# Patient Record
Sex: FEMALE | Race: WHITE | NOT HISPANIC OR LATINO | URBAN - METROPOLITAN AREA
[De-identification: names, ages, dates, MRNs, and addresses within clinical notes are randomized per-mention and may not be internally consistent; named-entity substitution may affect disease eponyms.]

---

## 2023-10-27 DIAGNOSIS — C34.90 LUNG CANCER: Primary | ICD-10-CM

## 2023-11-03 ENCOUNTER — DOCUMENTATION ONLY (OUTPATIENT)
Dept: HEMATOLOGY/ONCOLOGY | Facility: CLINIC | Age: 63
End: 2023-11-03
Payer: MEDICARE

## 2023-11-03 PROBLEM — E11.9 DIABETES MELLITUS TREATED WITH INSULIN: Status: ACTIVE | Noted: 2023-11-03

## 2023-11-03 PROBLEM — M06.9 RHEUMATOID ARTHRITIS, INVOLVING UNSPECIFIED SITE, UNSPECIFIED WHETHER RHEUMATOID FACTOR PRESENT: Status: ACTIVE | Noted: 2023-11-03

## 2023-11-03 PROBLEM — E27.49 IATROGENIC ADRENAL INSUFFICIENCY: Status: ACTIVE | Noted: 2023-11-03

## 2023-11-03 PROBLEM — Z79.4 DIABETES MELLITUS TREATED WITH INSULIN: Status: ACTIVE | Noted: 2023-11-03

## 2023-11-03 PROBLEM — C34.92 MALIGNANT NEOPLASM OF LEFT LUNG: Status: ACTIVE | Noted: 2023-11-03

## 2023-11-03 NOTE — PROGRESS NOTES
Subjective:       Patient ID: Rosanna Rich is a 63 y.o. female.    Chief Complaint: Follow-up (Patient is here for her new patient appointment, last seen was in 2019)    Referring: Self referral  Oncology: Dr. Nguyen    Diagnosis: Recurrent adenocarcinoma of lung overlapping sites    Current Treatment: [No matching plan found]     Treatment History:  2009-initial diagnosis. T2 N1 M0. S/P resection followed by adjuvant Cisplatin  8/11/2017: Second recurrence-T3 N0 adenocarcinoma RLL lobectomy. PDL-1 5%. Negative ALK. 4 cycles of adjuvant carboplatin and pemetrexed  8/30/2019: Recurrent T4 adenocarcinoma with pleural involvement, M1 lymph node gastrohepatic and left dome of liver.   9/19/19: Carboplatin/Taxol/Keytruda followed by Keytruda maintenance, last dose  between  4/ or 7/22/22---stopped due to pneumonitis  Pneumonitis-- was on Steroids  --SOB and oxygen      new nodule   1/ 2023   7mm                                                   9mm---                              8/2023 + PET still growing  Biopsy   9/27--- Adenocarcinoma  PET 10/18/2023--final report pending                                                        Clinical History:  This is a 64 y/o female with long standing history of recurrent adenocarcinoma of the lung. See above history and treatment.   She was last seen in this clinic as a second opinion in August of 2019. She continued her therapy in [place].   In August of 2023, she was diagnosed with her 4th recurrence in the RM.      Oncology History   Malignant neoplasm of left lung   9/25/2023 Pathology Significant Finding    RM core biopsy- Focal moderately differentiated adenocarcinoma  PDL-1 58%       Interval History:   11/06/2023   She presents today as a self-referral second opinion on her 4th recurrence of lung cancer.     Past Medical History:   Diagnosis Date    Anemia     Depression     Lung cancer       Past Surgical History:   Procedure Laterality Date    AUGMENTATION OF  BREAST      COLONOSCOPY      HYSTERECTOMY      LOBECTOMY Right     R middle and R lower lobe    LUNG BIOPSY Left     PORTACATH PLACEMENT      TUBAL LIGATION       Social History     Socioeconomic History    Marital status:    Tobacco Use    Smoking status: Former     Types: Cigarettes    Smokeless tobacco: Never   Substance and Sexual Activity    Alcohol use: Yes     Comment: Social      Family History   Adopted: Yes   Problem Relation Age of Onset    Cancer Father         Lung      Review of patient's allergies indicates:  No Known Allergies   Review of Systems   Constitutional:  Negative for appetite change and unexpected weight change.   HENT:  Negative for mouth sores.    Eyes:  Negative for visual disturbance.   Respiratory:  Positive for cough and shortness of breath.    Cardiovascular:  Positive for chest pain.   Gastrointestinal:  Negative for abdominal pain and diarrhea.   Genitourinary:  Negative for frequency.   Musculoskeletal:  Negative for back pain.   Skin:  Negative for rash.   Neurological:  Positive for headaches.   Hematological:  Negative for adenopathy. Bruises/bleeds easily.   Psychiatric/Behavioral:  The patient is nervous/anxious.       A complete 12 point ROS was performed in full with pertinent positives as described in interval history. Remainder of ROS done in full and are negative.        Objective:      Physical Exam  Vitals reviewed.   Constitutional:       General: She is awake.      Appearance: Normal appearance.   HENT:      Head: Normocephalic and atraumatic.      Right Ear: Tympanic membrane normal.      Left Ear: Tympanic membrane normal.      Mouth/Throat:      Mouth: Mucous membranes are moist.      Pharynx: Oropharynx is clear.   Eyes:      Extraocular Movements: Extraocular movements intact.      Conjunctiva/sclera: Conjunctivae normal.      Pupils: Pupils are equal, round, and reactive to light.   Cardiovascular:      Rate and Rhythm: Normal rate and regular rhythm.       Pulses: Normal pulses.      Heart sounds: Normal heart sounds.   Pulmonary:      Effort: Pulmonary effort is normal.      Breath sounds: Normal breath sounds and air entry.   Abdominal:      General: Abdomen is flat. Bowel sounds are normal.      Palpations: Abdomen is soft.      Tenderness: There is no abdominal tenderness.   Musculoskeletal:      Cervical back: Normal range of motion.      Right lower leg: No edema.      Left lower leg: No edema.   Lymphadenopathy:      Cervical: No cervical adenopathy.      Upper Body:      Right upper body: No axillary adenopathy.      Left upper body: No axillary adenopathy.      Lower Body: No right inguinal adenopathy. No left inguinal adenopathy.   Skin:     General: Skin is warm and dry.      Findings: Ecchymosis present.      Comments: Senile purpura bilateral upper extremities   Neurological:      General: No focal deficit present.      Mental Status: She is alert and oriented to person, place, and time. Mental status is at baseline.   Psychiatric:         Attention and Perception: Attention normal.         Mood and Affect: Mood and affect normal.         Behavior: Behavior is cooperative.     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  Resolved      It appears from the reports this patient has 1 local area of recurrence of her adenocarcinoma.    However ,I explained to the patient that I need to review her definitive images.    We discussed her options if she only has 1 area of recurrent disease.  Surgery, radiation.      I would be reluctant to resume systemic immunotherapy given her degree of intolerance to immunotherapy with pneumonitis diabetes, and adrenal insufficiency.          Plan:   GET PET  scan report and scan images   Follow-up with [facility]   agree with radiation therapy if only site---would agree with proton therapy given her history of pneumonitis-- to decrease her risk of radiation pneumonitis  GET NGS panel [facility] if not done in [place]   TeleMed visit in 3-4 weeks         Follow up in about 1 month (around 12/6/2023) for With ME, telemed visit.         Miguel Chisholm MD

## 2023-11-06 ENCOUNTER — OFFICE VISIT (OUTPATIENT)
Dept: HEMATOLOGY/ONCOLOGY | Facility: CLINIC | Age: 63
End: 2023-11-06
Payer: MEDICARE

## 2023-11-06 VITALS
WEIGHT: 239.44 LBS | SYSTOLIC BLOOD PRESSURE: 115 MMHG | TEMPERATURE: 98 F | HEART RATE: 92 BPM | HEIGHT: 65 IN | BODY MASS INDEX: 39.89 KG/M2 | DIASTOLIC BLOOD PRESSURE: 76 MMHG | OXYGEN SATURATION: 94 %

## 2023-11-06 DIAGNOSIS — M06.9 RHEUMATOID ARTHRITIS, INVOLVING UNSPECIFIED SITE, UNSPECIFIED WHETHER RHEUMATOID FACTOR PRESENT: ICD-10-CM

## 2023-11-06 DIAGNOSIS — C34.92 MALIGNANT NEOPLASM OF LEFT LUNG, UNSPECIFIED PART OF LUNG: Primary | ICD-10-CM

## 2023-11-06 DIAGNOSIS — C34.90 LUNG CANCER: ICD-10-CM

## 2023-11-06 DIAGNOSIS — E27.49 IATROGENIC ADRENAL INSUFFICIENCY: ICD-10-CM

## 2023-11-06 DIAGNOSIS — Z79.4 DIABETES MELLITUS TREATED WITH INSULIN: ICD-10-CM

## 2023-11-06 DIAGNOSIS — E11.9 DIABETES MELLITUS TREATED WITH INSULIN: ICD-10-CM

## 2023-11-06 PROCEDURE — 99205 PR OFFICE/OUTPT VISIT, NEW, LEVL V, 60-74 MIN: ICD-10-PCS | Mod: S$PBB,,, | Performed by: INTERNAL MEDICINE

## 2023-11-06 PROCEDURE — 99205 OFFICE O/P NEW HI 60 MIN: CPT | Mod: S$PBB,,, | Performed by: INTERNAL MEDICINE

## 2023-11-06 PROCEDURE — 99214 OFFICE O/P EST MOD 30 MIN: CPT | Mod: PBBFAC | Performed by: INTERNAL MEDICINE

## 2023-11-06 PROCEDURE — 99999 PR PBB SHADOW E&M-EST. PATIENT-LVL IV: CPT | Mod: PBBFAC,,, | Performed by: INTERNAL MEDICINE

## 2023-11-06 PROCEDURE — 99999 PR PBB SHADOW E&M-EST. PATIENT-LVL IV: ICD-10-PCS | Mod: PBBFAC,,, | Performed by: INTERNAL MEDICINE

## 2023-11-06 RX ORDER — OLMESARTAN MEDOXOMIL 40 MG/1
TABLET ORAL
COMMUNITY

## 2023-11-06 RX ORDER — PREDNISONE 5 MG/1
5 TABLET ORAL
COMMUNITY
Start: 2023-10-30

## 2023-11-06 RX ORDER — INSULIN LISPRO 100 [IU]/ML
INJECTION, SOLUTION INTRAVENOUS; SUBCUTANEOUS
COMMUNITY
Start: 2023-10-10

## 2023-11-06 RX ORDER — ASPIRIN 325 MG/1
TABLET, FILM COATED ORAL
COMMUNITY

## 2023-11-06 RX ORDER — HYDROCODONE BITARTRATE AND ACETAMINOPHEN 10; 300 MG/1; MG/1
TABLET ORAL
COMMUNITY

## 2023-11-06 RX ORDER — ATORVASTATIN CALCIUM 20 MG/1
20 TABLET, FILM COATED ORAL
COMMUNITY
Start: 2023-09-21

## 2023-11-06 RX ORDER — HYDROXYCHLOROQUINE SULFATE 200 MG/1
200 TABLET, FILM COATED ORAL 2 TIMES DAILY
COMMUNITY
Start: 2023-10-03

## 2023-11-06 RX ORDER — LEVOTHYROXINE SODIUM 175 UG/1
175 TABLET ORAL
COMMUNITY
Start: 2023-10-23

## 2023-11-06 RX ORDER — SULFASALAZINE 500 MG/1
TABLET ORAL
COMMUNITY
Start: 2023-08-21

## 2023-11-06 RX ORDER — AMLODIPINE BESYLATE 5 MG/1
5 TABLET ORAL
COMMUNITY
Start: 2023-10-11

## 2023-11-06 RX ORDER — ZOLPIDEM TARTRATE 10 MG/1
10 TABLET ORAL NIGHTLY PRN
COMMUNITY
Start: 2023-10-09

## 2023-12-05 ENCOUNTER — PATIENT MESSAGE (OUTPATIENT)
Dept: HEMATOLOGY/ONCOLOGY | Facility: CLINIC | Age: 63
End: 2023-12-05

## 2023-12-05 ENCOUNTER — TELEPHONE (OUTPATIENT)
Dept: HEMATOLOGY/ONCOLOGY | Facility: CLINIC | Age: 63
End: 2023-12-05
Payer: MEDICARE

## 2023-12-05 NOTE — TELEPHONE ENCOUNTER
Spoke to patients daughter; patient is currently having bronchoscope at [facility].  Patient has appointment with Dr. Ramos 12/14/23 for simulation.  Radiation is tenatative to start depending on biopsy results.  Dina or her mother will call to advise when pathology resulted.  Advised I will also follow up on 12/14/23.

## 2023-12-15 ENCOUNTER — TELEPHONE (OUTPATIENT)
Dept: HEMATOLOGY/ONCOLOGY | Facility: CLINIC | Age: 63
End: 2023-12-15
Payer: MEDICARE

## 2023-12-15 NOTE — TELEPHONE ENCOUNTER
Spoke to patient; she is currently still at [facility].    Patient had simulation 12/14/23; radiation therapy is expected to start either 12/26/23 or 1/3/24, Monday through Friday for 7 weeks.  Advised I would have her appointment pushed back 8 weeks from 1/3/2024.  Patient confirmed she is active on portal and thanked me for calling.

## 2024-02-26 NOTE — PROGRESS NOTES
Subjective:       Patient ID: Rosanna Rich is a 63 y.o. female.    Chief Complaint: Follow-up (Telephone visit, has questions regarding her immunotherapy)    Referring: Self referral  Oncology: Dr. Nguyen    Diagnosis: Recurrent adenocarcinoma of lung overlapping sites    Current Treatment: [No matching plan found]     Treatment History:  2009-initial diagnosis. T2 N1 M0. S/P resection followed by adjuvant Cisplatin  8/11/2017: Second recurrence-T3 N0 adenocarcinoma RLL lobectomy. PDL-1 5%. Negative ALK. 4 cycles of adjuvant carboplatin and pemetrexed  8/30/2019: Recurrent T4 adenocarcinoma with pleural involvement, M1 lymph node gastrohepatic and left dome of liver.   9/19/19: Carboplatin/Taxol/Keytruda followed by Keytruda maintenance, last dose  between  4/ or 7/22/22---stopped due to pneumonitis  Pneumonitis-- was on Steroids  --SOB and oxygen      new nodule   1/ 2023   7mm                                                   9mm---                              8/2023 + PET still growing  Biopsy   9/27--- Adenocarcinoma  PET 10/18/2023--final report pending                                                        Clinical History:  This is a 62 y/o female with long standing history of recurrent adenocarcinoma of the lung. See above history and treatment.   She was last seen in this clinic as a second opinion in August of 2019. She continued her therapy in [place].   In August of 2023, she was diagnosed with her 4th recurrence in the RM.      Oncology History Overview Note   Treatment History:  2009-initial diagnosis. T2 N1 M0. S/P resection followed by adjuvant Cisplatin  8/11/2017: Second recurrence-T3 N0 adenocarcinoma RLL lobectomy. PDL-1 5%. Negative ALK. 4 cycles of adjuvant carboplatin and pemetrexed  8/30/2019: Recurrent T4 adenocarcinoma with pleural involvement, M1 lymph node gastrohepatic and left dome of liver.   9/19/19: Carboplatin/Taxol/Keytruda followed by Keytruda maintenance, last dose   between  4/ or 7/22/22---stopped due to pneumonitis  Pneumonitis-- was on Steroids  --SOB and oxygen      new nodule   1/ 2023   7mm                                                   9mm---                              8/2023 + PET still growing  Biopsy   9/27--- Adenocarcinoma     Malignant neoplasm of left lung   9/25/2023 Pathology Significant Finding    RM core biopsy- Focal moderately differentiated adenocarcinoma  PDL-1 58%     10/18/2023 Imaging Significant Findings    10/18/2023: PET-CT:   Asymmetric left-sided adenoid and lingula tonsil involving central base of tongue SUV 14   Left upper lobe pulmonary mass 1.7 cm SUV of 7.9, no hilar or mediastinal uptake no pulmonary parenchymal mass .    Left port, asymmetric capsular breast calcification on implant on the right, chronic interstitial lung disease, no lytic or blastic lesions the left upper lobe pulmonary nodules enlarged from 1.1 cm to 1.7 cm, consistent with growing neoplasm       1/3/2024 - 2/21/2024 Radiation Therapy    Start Treatment: 01-  End Treatment: 02-  Left lung/Mediast.   200 cGyRBE 35/35 7000/7000 cGyRBE=\   Proton 3-D plan   ==========================================================      1/3/2024 - 2/14/2024 Chemotherapy    Weekly carbo/taxol with xrt     1/23/2024 Pathology Significant Finding    MD VALDEZ ANISH: Mutation Analysis Precision Panel Interpretation and Report    KRAS c.35G>A p.G12D Exon 2 16% SNV - Missense   More than 5 genes. See details  in full report        Interval History:   02/27/2024     She presents today as a self-referral second opinion on her 4th recurrence of lung cancer.   Since our last visit, this patient has had a definitive radiation with weekly carbo Taxol and Gutiérrez.  She had proton radiation.    Her RA is good controlled.  Her steroids are weaned down.  Her sugar has been better controlled.  She get some nausea during therapy no vomiting.  No changing neuropathy.  Her breathing and  shortness of breath is stable.  No cardiac type chest pain.  She is eating well.  We had a long discussion regarding the risk of observation versus immunotherapy maintenance       Common immune-related adverse events (irAEs) - Treatment is associated with irAEs that typically are transient but occasionally can be severe or fatal. The most common and important irAEs are dermatologic, diarrhea/colitis, hepatotoxicity, pneumonitis, and endocrinopathies, although other sites can also be affected.    Past Medical History:   Diagnosis Date    Anemia     Depression     Lung cancer       Past Surgical History:   Procedure Laterality Date    AUGMENTATION OF BREAST      COLONOSCOPY      HYSTERECTOMY      LOBECTOMY Right     R middle and R lower lobe    LUNG BIOPSY Left     PORTACATH PLACEMENT      TUBAL LIGATION       Social History     Socioeconomic History    Marital status:    Tobacco Use    Smoking status: Former     Types: Cigarettes    Smokeless tobacco: Never   Substance and Sexual Activity    Alcohol use: Yes     Comment: Social      Family History   Adopted: Yes   Problem Relation Age of Onset    Cancer Father         Lung      Review of patient's allergies indicates:  No Known Allergies   Review of Systems   Constitutional:  Negative for appetite change and unexpected weight change.   HENT:  Positive for mouth sores.    Eyes:  Negative for visual disturbance.   Respiratory:  Positive for shortness of breath. Negative for cough.    Cardiovascular:  Negative for chest pain.   Gastrointestinal:  Negative for abdominal pain and diarrhea.   Genitourinary:  Negative for frequency.   Musculoskeletal:  Negative for back pain.   Skin:  Positive for rash.   Neurological:  Positive for headaches.   Hematological:  Negative for adenopathy.   Psychiatric/Behavioral:  The patient is not nervous/anxious.      A complete 12 point ROS was performed in full with pertinent positives as described in interval history. Remainder  of ROS done in full and are negative.        Objective:      Physical Exam  Pulmonary:      Effort: Pulmonary effort is normal.   Neurological:      Mental Status: She is alert and oriented to person, place, and time.   Psychiatric:         Mood and Affect: Mood normal.         Behavior: Behavior normal.         Thought Content: Thought content normal.         Judgment: Judgment normal.     LABS AND IMAGING REVIEWED IN EPIC      Assessment:   Recurrent adenocarcinoma to RM---new T1 vs M1a  lef  It appears from the reports this patient has 1 local area of recurrence of her adenocarcinoma.  However ,I explained to the patient that I need to review her definitive images.  We discussed her options if she only has 1 area of recurrent disease.  Surgery, radiation.I would be reluctant to resume systemic immunotherapy given her degree of intolerance to immunotherapy with pneumonitis diabetes, and adrenal insufficiency.    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  Resolved    Plan:   Has plans for immunotherapy  Scan in [place] 3/18 and then for 3/19 for immunotherapy  Immunotherapy they have plan for q.4 weeks x1 year.    Discussed q.2 weeks dosing versus q.4 week dosing given history.    She is going to call us back after her visit on March 19th with [facility].    Follow-up proximally April 16, 2024 with CBC chemistry.    She will not need chemo education   She will sign a new consent that day  Patient will call us around March 19th we can go into the system and add the treatment plan    Follow up in about 7 weeks (around 4/16/2024) for Treatment visit--same day with labs and treatment.   Orders Placed This Encounter    CBC Auto Differential    Comprehensive Metabolic Panel        Miguel Chisholm MD    Telemed: This visit was a telemedicine/telephone  visit.  Real-time audio and video were used following [facility] protocols.  The patient and/or family agreed to the security of information at the location.  I was located in the [facility] with this visit occurred.  The patient was located.  At home    We reviewed this patient's chemotherapy and radiation records and labs from [facility]   We reviewed her pathology.    We discussed the ongoing plan.    Total time in chart review and discussion with the patient 40 minutes        I am licensed to practice in the [facility].

## 2024-02-27 ENCOUNTER — OFFICE VISIT (OUTPATIENT)
Dept: HEMATOLOGY/ONCOLOGY | Facility: CLINIC | Age: 64
End: 2024-02-27
Payer: MEDICARE

## 2024-02-27 DIAGNOSIS — C34.92 MALIGNANT NEOPLASM OF LEFT LUNG, UNSPECIFIED PART OF LUNG: Primary | ICD-10-CM

## 2024-02-27 PROCEDURE — 99443 PR PHYSICIAN TELEPHONE EVALUATION 21-30 MIN: CPT | Mod: 95,,, | Performed by: INTERNAL MEDICINE

## 2024-02-27 RX ORDER — BISACODYL 5 MG
5 TABLET, DELAYED RELEASE (ENTERIC COATED) ORAL
COMMUNITY

## 2024-03-21 ENCOUNTER — TELEPHONE (OUTPATIENT)
Dept: HEMATOLOGY/ONCOLOGY | Facility: CLINIC | Age: 64
End: 2024-03-21
Payer: MEDICARE

## 2024-04-15 NOTE — PROGRESS NOTES
Subjective:       Patient ID: Rosanna Rich is a 63 y.o. female.    Chief Complaint: No chief complaint on file.    Referring: Self referral  Oncology: Dr. Nguyen    Diagnosis: Recurrent adenocarcinoma of lung overlapping sites    Current Treatment: OP DURVALUMAB 1500 MG Q4W                                           Clinical History:  This is a 62 y/o female with long standing history of recurrent adenocarcinoma of the lung. See above history and treatment.   She was last seen in this clinic as a second opinion in August of 2019. She continued her therapy in [place].   In August of 2023, she was diagnosed with her 4th recurrence in the RM.      Oncology History Overview Note   Treatment History:  2009-initial diagnosis. T2 N1 M0. S/P resection followed by adjuvant Cisplatin  8/11/2017: Second recurrence-T3 N0 adenocarcinoma RLL lobectomy. PDL-1 5%. Negative ALK. 4 cycles of adjuvant carboplatin and pemetrexed  8/30/2019: Recurrent T4 adenocarcinoma with pleural involvement, M1 lymph node gastrohepatic and left dome of liver.   9/19/19: Carboplatin/Taxol/Keytruda followed by Keytruda maintenance, last dose  between  4/ or 7/22/22---stopped due to pneumonitis  Pneumonitis-- was on Steroids  --SOB and oxygen  1/ 2023 new nodule  7mm/9mm---  8/2023 + PET still growing  Biopsy   9/27--- Adenocarcinoma     Malignant neoplasm of left lung   9/25/2023 Pathology Significant Finding    RM core biopsy- Focal moderately differentiated adenocarcinoma  PDL-1 58%     9/25/2023 Cancer Staged    Staging form: Lung, AJCC 8th Edition  - Clinical stage from 9/25/2023: Stage IIB (rcT1c, cN1, cM0)     10/18/2023 Imaging Significant Findings    10/18/2023: PET-CT:   Asymmetric left-sided adenoid and lingula tonsil involving central base of tongue SUV 14   Left upper lobe pulmonary mass 1.7 cm SUV of 7.9, no hilar or mediastinal uptake no pulmonary parenchymal mass .    Left port, asymmetric capsular breast calcification on  implant on the right, chronic interstitial lung disease, no lytic or blastic lesions the left upper lobe pulmonary nodules enlarged from 1.1 cm to 1.7 cm, consistent with growing neoplasm       1/3/2024 - 2/21/2024 Radiation Therapy    Start Treatment: 01-  End Treatment: 02-  Left lung/Mediast.   200 cGyRBE 35/35 7000/7000 cGyRBE=\   Proton 3-D plan   ==========================================================      1/3/2024 - 2/14/2024 Chemotherapy    Weekly carbo/taxol with xrt     1/23/2024 Pathology Significant Finding    MD JOSÉ MIGUEL OCONNELL: Mutation Analysis Precision Panel Interpretation and Report    KRAS c.35G>A p.G12D Exon 2 16% SNV - Missense   More than 5 genes. See details  in full report      2/23/2024 Research Study Participant    Research Study:    2/23/2024 -  TX-Chemotherapy/Biotherapy/Investigational/SMI (Autogenerated)      Treatment Plan     2019-1164  Autologous Lymphocyte Infusion Phase 2 (OP)      Chemotherapy summary: [No matching medication found in this treatment plan]      Treatment Dates: 2/23/2024 (Planned) to 4/5/2024 (Planned)   Line of Treatment: Solid: Metastatic, Second Line and Subsequent      Treatment Goal: Life Prolongation                  3/18/2024 Imaging Significant Findings    IMPRESSION:  Left upper lung nodules has decreased in size and metabolic activity. No new nodules or adenopathy.  Resolution of FDG avid left suprahilar lymph node.  ACTIONABLE ITEMS/RECOMMENDATIONS*: None.     3/19/2024 -  Chemotherapy    Plan Name: OP DURVALUMAB 1500 MG Q4W       Interval History:   04/16/2024   Patient presents with friend for lung cancer treatment. She received her first infusion at [facility]. She is scheduled for C2 today. She tolerated well overall, aside from a mild RA flair in her shoulders. She received joint injections. She feels well. She has no irSE, no diarrhea, rash, itching or SOB. She has an insulin pump, sugars well controlled.   Past Medical History:    Diagnosis Date    Anemia     Depression     Lung cancer       Past Surgical History:   Procedure Laterality Date    AUGMENTATION OF BREAST      COLONOSCOPY      HYSTERECTOMY      LOBECTOMY Right     R middle and R lower lobe    LUNG BIOPSY Left     PORTACATH PLACEMENT      TUBAL LIGATION       Social History     Socioeconomic History    Marital status:    Tobacco Use    Smoking status: Former     Types: Cigarettes    Smokeless tobacco: Never   Substance and Sexual Activity    Alcohol use: Yes     Comment: Social      Family History   Adopted: Yes   Problem Relation Name Age of Onset    Cancer Father          Lung      Review of patient's allergies indicates:  No Known Allergies   Review of Systems   Constitutional:  Negative for appetite change and unexpected weight change.   HENT:  Positive for mouth sores.    Eyes:  Negative for visual disturbance.   Respiratory:  Positive for shortness of breath. Negative for cough.    Cardiovascular:  Negative for chest pain.   Gastrointestinal:  Negative for abdominal pain and diarrhea.   Genitourinary:  Negative for frequency.   Musculoskeletal:  Negative for back pain.   Skin:  Positive for rash.   Neurological:  Positive for headaches.   Hematological:  Negative for adenopathy.   Psychiatric/Behavioral:  The patient is not nervous/anxious.        A complete 12 point ROS was performed in full with pertinent positives as described in interval history. Remainder of ROS done in full and are negative.        Objective:      Physical Exam  Pulmonary:      Effort: Pulmonary effort is normal.   Neurological:      Mental Status: She is alert and oriented to person, place, and time.   Psychiatric:         Mood and Affect: Mood normal.         Behavior: Behavior normal.         Thought Content: Thought content normal.         Judgment: Judgment normal.       LABS AND IMAGING REVIEWED IN EPIC      Assessment:   Recurrent adenocarcinoma to RM---new T1 vs M1a  lef  It appears  from the reports this patient has 1 local area of recurrence of her adenocarcinoma.  However ,I explained to the patient that I need to review her definitive images.  We discussed her options if she only has 1 area of recurrent disease.  Surgery, radiation.I would be reluctant to resume systemic immunotherapy given her degree of intolerance to immunotherapy with pneumonitis diabetes, and adrenal insufficiency.    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  Resolved  Hypothyroidism    Plan:   Her first durvalumab was on 3/19/24 at [facility]. Plan q4 weeks x1 year.   C2 today   She will repeat PET and MRI with [facility] in 7/19/24  CBC, CMP, TSH on return        Follow up in about 4 weeks (around 5/14/2024) for Labs, TD with Cassy, same day infusion.   Orders Placed This Encounter    Comprehensive Metabolic Panel    CBC Auto Differential    TSH

## 2024-04-16 ENCOUNTER — INFUSION (OUTPATIENT)
Dept: INFUSION THERAPY | Facility: HOSPITAL | Age: 64
End: 2024-04-16
Attending: INTERNAL MEDICINE
Payer: MEDICARE

## 2024-04-16 ENCOUNTER — OFFICE VISIT (OUTPATIENT)
Dept: HEMATOLOGY/ONCOLOGY | Facility: CLINIC | Age: 64
End: 2024-04-16
Payer: MEDICARE

## 2024-04-16 VITALS
HEART RATE: 79 BPM | TEMPERATURE: 99 F | DIASTOLIC BLOOD PRESSURE: 83 MMHG | SYSTOLIC BLOOD PRESSURE: 122 MMHG | BODY MASS INDEX: 40.96 KG/M2 | WEIGHT: 245.81 LBS | HEIGHT: 65 IN | OXYGEN SATURATION: 95 %

## 2024-04-16 VITALS
HEART RATE: 79 BPM | DIASTOLIC BLOOD PRESSURE: 85 MMHG | OXYGEN SATURATION: 94 % | TEMPERATURE: 97 F | WEIGHT: 244 LBS | SYSTOLIC BLOOD PRESSURE: 135 MMHG | BODY MASS INDEX: 40.6 KG/M2

## 2024-04-16 DIAGNOSIS — M06.9 RHEUMATOID ARTHRITIS, INVOLVING UNSPECIFIED SITE, UNSPECIFIED WHETHER RHEUMATOID FACTOR PRESENT: ICD-10-CM

## 2024-04-16 DIAGNOSIS — E03.2 HYPOTHYROIDISM DUE TO MEDICATION: ICD-10-CM

## 2024-04-16 DIAGNOSIS — E11.9 DIABETES MELLITUS TREATED WITH INSULIN: ICD-10-CM

## 2024-04-16 DIAGNOSIS — D84.821 IMMUNODEFICIENCY DUE TO DRUGS: ICD-10-CM

## 2024-04-16 DIAGNOSIS — Z79.899 IMMUNODEFICIENCY DUE TO DRUGS: ICD-10-CM

## 2024-04-16 DIAGNOSIS — Z79.4 DIABETES MELLITUS TREATED WITH INSULIN: ICD-10-CM

## 2024-04-16 DIAGNOSIS — C34.12 MALIGNANT NEOPLASM OF UPPER LOBE OF LEFT LUNG: Primary | ICD-10-CM

## 2024-04-16 DIAGNOSIS — C34.92 MALIGNANT NEOPLASM OF LEFT LUNG, UNSPECIFIED PART OF LUNG: Primary | ICD-10-CM

## 2024-04-16 PROCEDURE — 25000003 PHARM REV CODE 250: Performed by: NURSE PRACTITIONER

## 2024-04-16 PROCEDURE — 99999 PR PBB SHADOW E&M-EST. PATIENT-LVL IV: CPT | Mod: PBBFAC,,, | Performed by: NURSE PRACTITIONER

## 2024-04-16 PROCEDURE — 99215 OFFICE O/P EST HI 40 MIN: CPT | Mod: S$PBB,,, | Performed by: NURSE PRACTITIONER

## 2024-04-16 PROCEDURE — 96413 CHEMO IV INFUSION 1 HR: CPT

## 2024-04-16 PROCEDURE — 63600175 PHARM REV CODE 636 W HCPCS: Performed by: NURSE PRACTITIONER

## 2024-04-16 PROCEDURE — 99214 OFFICE O/P EST MOD 30 MIN: CPT | Mod: PBBFAC | Performed by: NURSE PRACTITIONER

## 2024-04-16 RX ORDER — EPINEPHRINE 0.3 MG/.3ML
0.3 INJECTION SUBCUTANEOUS ONCE AS NEEDED
Status: DISCONTINUED | OUTPATIENT
Start: 2024-04-16 | End: 2024-04-16 | Stop reason: HOSPADM

## 2024-04-16 RX ORDER — HEPARIN 100 UNIT/ML
500 SYRINGE INTRAVENOUS
Status: CANCELLED | OUTPATIENT
Start: 2024-04-16

## 2024-04-16 RX ORDER — HEPARIN 100 UNIT/ML
500 SYRINGE INTRAVENOUS
Status: DISCONTINUED | OUTPATIENT
Start: 2024-04-16 | End: 2024-04-16 | Stop reason: HOSPADM

## 2024-04-16 RX ORDER — SODIUM CHLORIDE 0.9 % (FLUSH) 0.9 %
10 SYRINGE (ML) INJECTION
Status: DISCONTINUED | OUTPATIENT
Start: 2024-04-16 | End: 2024-04-16 | Stop reason: HOSPADM

## 2024-04-16 RX ORDER — DIPHENHYDRAMINE HYDROCHLORIDE 50 MG/ML
50 INJECTION INTRAMUSCULAR; INTRAVENOUS ONCE AS NEEDED
Status: CANCELLED | OUTPATIENT
Start: 2024-04-16

## 2024-04-16 RX ORDER — LOSARTAN POTASSIUM 100 MG/1
100 TABLET ORAL DAILY
COMMUNITY

## 2024-04-16 RX ORDER — SODIUM CHLORIDE 0.9 % (FLUSH) 0.9 %
10 SYRINGE (ML) INJECTION
Status: CANCELLED | OUTPATIENT
Start: 2024-04-16

## 2024-04-16 RX ORDER — DIPHENHYDRAMINE HYDROCHLORIDE 50 MG/ML
50 INJECTION INTRAMUSCULAR; INTRAVENOUS ONCE AS NEEDED
Status: DISCONTINUED | OUTPATIENT
Start: 2024-04-16 | End: 2024-04-16 | Stop reason: HOSPADM

## 2024-04-16 RX ORDER — EPINEPHRINE 0.3 MG/.3ML
0.3 INJECTION SUBCUTANEOUS ONCE AS NEEDED
Status: CANCELLED | OUTPATIENT
Start: 2024-04-16

## 2024-04-16 RX ADMIN — SODIUM CHLORIDE: 9 INJECTION, SOLUTION INTRAVENOUS at 11:04

## 2024-04-16 RX ADMIN — SODIUM CHLORIDE 1500 MG: 9 INJECTION, SOLUTION INTRAVENOUS at 11:04

## 2024-04-16 RX ADMIN — HEPARIN 500 UNITS: 100 SYRINGE at 12:04

## 2024-05-13 NOTE — PROGRESS NOTES
Subjective:       Patient ID: Rosanna Rich is a 63 y.o. female.    Chief Complaint: Follow-up (Patient is here for her treatment visit)    Referring: Self referral  Oncology: Dr. Nguyen    Diagnosis: Recurrent adenocarcinoma of lung overlapping sites    Current Treatment: OP DURVALUMAB 1500 MG Q4W                                           Clinical History:  This is a 62 y/o female with long standing history of recurrent adenocarcinoma of the lung. See above history and treatment.   She was last seen in this clinic as a second opinion in August of 2019. She continued her therapy in [place].   In August of 2023, she was diagnosed with her 4th recurrence in the RM.      Oncology History Overview Note   Treatment History:  2009-initial diagnosis. T2 N1 M0. S/P resection followed by adjuvant Cisplatin  8/11/2017: Second recurrence-T3 N0 adenocarcinoma RLL lobectomy. PDL-1 5%. Negative ALK. 4 cycles of adjuvant carboplatin and pemetrexed  8/30/2019: Recurrent T4 adenocarcinoma with pleural involvement, M1 lymph node gastrohepatic and left dome of liver.   9/19/19: Carboplatin/Taxol/Keytruda followed by Keytruda maintenance, last dose  between  4/ or 7/22/22---stopped due to pneumonitis  Pneumonitis-- was on Steroids  --SOB and oxygen  1/ 2023 new nodule  7mm/9mm---  8/2023 + PET still growing  Biopsy   9/27--- Adenocarcinoma     Malignant neoplasm of left lung   9/25/2023 Pathology Significant Finding    RM core biopsy- Focal moderately differentiated adenocarcinoma  PDL-1 58%     9/25/2023 Cancer Staged    Staging form: Lung, AJCC 8th Edition  - Clinical stage from 9/25/2023: Stage IIB (rcT1c, cN1, cM0)     10/18/2023 Imaging Significant Findings    10/18/2023: PET-CT:   Asymmetric left-sided adenoid and lingula tonsil involving central base of tongue SUV 14   Left upper lobe pulmonary mass 1.7 cm SUV of 7.9, no hilar or mediastinal uptake no pulmonary parenchymal mass .    Left port, asymmetric capsular  breast calcification on implant on the right, chronic interstitial lung disease, no lytic or blastic lesions the left upper lobe pulmonary nodules enlarged from 1.1 cm to 1.7 cm, consistent with growing neoplasm       1/3/2024 - 2/21/2024 Radiation Therapy    Start Treatment: 01-  End Treatment: 02-  Left lung/Mediast.   200 cGyRBE 35/35 7000/7000 cGyRBE=\   Proton 3-D plan   ==========================================================      1/3/2024 - 2/14/2024 Chemotherapy    Weekly carbo/taxol with xrt     1/23/2024 Pathology Significant Finding    MD JOSÉ MIGUEL OCONNELL: Mutation Analysis Precision Panel Interpretation and Report    KRAS c.35G>A p.G12D Exon 2 16% SNV - Missense   More than 5 genes. See details  in full report      2/23/2024 Research Study Participant    Research Study:    2/23/2024 -  TX-Chemotherapy/Biotherapy/Investigational/SMI (Autogenerated)      Treatment Plan     2019-1164  Autologous Lymphocyte Infusion Phase 2 (OP)      Chemotherapy summary: [No matching medication found in this treatment plan]      Treatment Dates: 2/23/2024 (Planned) to 4/5/2024 (Planned)   Line of Treatment: Solid: Metastatic, Second Line and Subsequent      Treatment Goal: Life Prolongation                  3/18/2024 Imaging Significant Findings    IMPRESSION:  Left upper lung nodules has decreased in size and metabolic activity. No new nodules or adenopathy.  Resolution of FDG avid left suprahilar lymph node.  ACTIONABLE ITEMS/RECOMMENDATIONS*: None.     3/19/2024 -  Chemotherapy    Plan Name: OP DURVALUMAB 1500 MG Q4W       Interval History:   05/14/2024   Patient presents with friend for lung cancer treatment. She received her first infusion at [facility]. She is scheduled for C3 today. She seems to be tolerating well overall.She again had a RA flair that lasted a day. Her pain was controlled with percocet, however, this time was worse than last time. She has no irSE, no diarrhea, rash, itching or SOB. She has an  insulin pump, sugars not at goal, but overall stable. She has chronic PEREZ, she sees pulmonary at [facility], she was prescribed an ICS which was expensive, so she did not get it filled. I will prescribe a substitute on insurance formulary. She is adamant about continuing treatment.   Past Medical History:   Diagnosis Date    Anemia     Depression     Lung cancer       Past Surgical History:   Procedure Laterality Date    AUGMENTATION OF BREAST      COLONOSCOPY      HYSTERECTOMY      LOBECTOMY Right     R middle and R lower lobe    LUNG BIOPSY Left     PORTACATH PLACEMENT      TUBAL LIGATION       Social History     Socioeconomic History    Marital status:    Tobacco Use    Smoking status: Former     Types: Cigarettes    Smokeless tobacco: Never   Substance and Sexual Activity    Alcohol use: Yes     Comment: Social      Family History   Adopted: Yes   Problem Relation Name Age of Onset    Cancer Father          Lung      Review of patient's allergies indicates:  No Known Allergies   Review of Systems   Constitutional:  Negative for appetite change and unexpected weight change.   HENT:  Positive for mouth sores.    Eyes:  Negative for visual disturbance.   Respiratory:  Positive for shortness of breath. Negative for cough.    Cardiovascular:  Negative for chest pain.   Gastrointestinal:  Negative for abdominal pain and diarrhea.   Genitourinary:  Negative for frequency.   Musculoskeletal:  Negative for back pain.   Skin:  Positive for rash.   Neurological:  Positive for headaches.   Hematological:  Negative for adenopathy.   Psychiatric/Behavioral:  The patient is not nervous/anxious.        A complete 12 point ROS was performed in full with pertinent positives as described in interval history. Remainder of ROS done in full and are negative.        Objective:      Vitals:    05/14/24 1321   BP: 122/87   Pulse: 89   Temp: 98.8 °F (37.1 °C)      Physical Exam  Constitutional:       Appearance: Normal appearance.    HENT:      Head: Normocephalic and atraumatic.      Nose: Nose normal.      Mouth/Throat:      Mouth: Mucous membranes are moist.   Eyes:      Extraocular Movements: Extraocular movements intact.      Conjunctiva/sclera: Conjunctivae normal.      Pupils: Pupils are equal, round, and reactive to light.   Cardiovascular:      Rate and Rhythm: Normal rate and regular rhythm.      Pulses: Normal pulses.   Pulmonary:      Effort: Pulmonary effort is normal.      Breath sounds: Normal breath sounds.   Abdominal:      General: Bowel sounds are normal.      Palpations: Abdomen is soft.   Musculoskeletal:      Cervical back: Normal range of motion and neck supple.   Skin:     Findings: Bruising present.      Comments: Areas of ecchymosis to forearms. Bruising to abdomen from diabetes monitor.    Neurological:      General: No focal deficit present.      Mental Status: She is alert and oriented to person, place, and time. Mental status is at baseline.   Psychiatric:         Mood and Affect: Mood normal.         Behavior: Behavior normal.         Thought Content: Thought content normal.         Judgment: Judgment normal.       LABS AND IMAGING REVIEWED IN EPIC      Assessment:   Recurrent adenocarcinoma to RM---new T1 vs M1a  lef  It appears from the reports this patient has 1 local area of recurrence of her adenocarcinoma.  However ,I explained to the patient that I need to review her definitive images.  We discussed her options if she only has 1 area of recurrent disease.  Surgery, radiation.I would be reluctant to resume systemic immunotherapy given her degree of intolerance to immunotherapy with pneumonitis diabetes, and adrenal insufficiency.    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  Resolved  Hypothyroidism  Risk of pneumonitis due to prior  "history-prescribed QVAR but $$$$. Changed to pulmicort  Chronic PEREZ    Plan:   Her first durvalumab was on 3/19/24 at [facility]. Plan q4 weeks x1 year.   She is adamant about continuing treatment q4w at this time. I have offered q2w. She would like to periodically check her lungs for infiltrates to give her "peace of mind" She is aware to call for any sign of worsening SOB, cough, fever, etc.  C3 today   She will repeat PET and MRI with [facility] in 7/19/24  CBC, CMP, TSH on return  CXR at [facility]          Follow up in about 4 weeks (around 6/11/2024) for Labs, TD with [facility] and same day infusion.       Orders Placed This Encounter    X-Ray Chest PA And Lateral    budesonide 180mcg (PULMICORT FLEXHALER) 180 mcg/actuation AePB                "

## 2024-05-14 ENCOUNTER — OFFICE VISIT (OUTPATIENT)
Dept: HEMATOLOGY/ONCOLOGY | Facility: CLINIC | Age: 64
End: 2024-05-14
Payer: MEDICARE

## 2024-05-14 ENCOUNTER — INFUSION (OUTPATIENT)
Dept: INFUSION THERAPY | Facility: HOSPITAL | Age: 64
End: 2024-05-14
Attending: INTERNAL MEDICINE
Payer: MEDICARE

## 2024-05-14 VITALS
OXYGEN SATURATION: 94 % | HEIGHT: 65 IN | SYSTOLIC BLOOD PRESSURE: 122 MMHG | TEMPERATURE: 99 F | DIASTOLIC BLOOD PRESSURE: 87 MMHG | BODY MASS INDEX: 40.62 KG/M2 | HEART RATE: 89 BPM | WEIGHT: 243.81 LBS | RESPIRATION RATE: 20 BRPM

## 2024-05-14 VITALS
TEMPERATURE: 99 F | HEART RATE: 89 BPM | WEIGHT: 243.81 LBS | SYSTOLIC BLOOD PRESSURE: 122 MMHG | DIASTOLIC BLOOD PRESSURE: 87 MMHG | HEIGHT: 65 IN | OXYGEN SATURATION: 94 % | BODY MASS INDEX: 40.62 KG/M2

## 2024-05-14 DIAGNOSIS — C34.12 MALIGNANT NEOPLASM OF UPPER LOBE OF LEFT LUNG: Primary | ICD-10-CM

## 2024-05-14 DIAGNOSIS — D84.821 IMMUNODEFICIENCY DUE TO DRUGS: ICD-10-CM

## 2024-05-14 DIAGNOSIS — R05.2 SUBACUTE COUGH: ICD-10-CM

## 2024-05-14 DIAGNOSIS — Z79.899 IMMUNODEFICIENCY DUE TO DRUGS: ICD-10-CM

## 2024-05-14 DIAGNOSIS — J42 CHRONIC BRONCHITIS, UNSPECIFIED CHRONIC BRONCHITIS TYPE: ICD-10-CM

## 2024-05-14 DIAGNOSIS — C34.92 MALIGNANT NEOPLASM OF LEFT LUNG, UNSPECIFIED PART OF LUNG: Primary | ICD-10-CM

## 2024-05-14 DIAGNOSIS — E03.2 HYPOTHYROIDISM DUE TO MEDICATION: ICD-10-CM

## 2024-05-14 DIAGNOSIS — E11.9 DIABETES MELLITUS TREATED WITH INSULIN: ICD-10-CM

## 2024-05-14 DIAGNOSIS — Z79.4 DIABETES MELLITUS TREATED WITH INSULIN: ICD-10-CM

## 2024-05-14 PROCEDURE — 25000003 PHARM REV CODE 250: Performed by: NURSE PRACTITIONER

## 2024-05-14 PROCEDURE — 96413 CHEMO IV INFUSION 1 HR: CPT

## 2024-05-14 PROCEDURE — 63600175 PHARM REV CODE 636 W HCPCS: Performed by: NURSE PRACTITIONER

## 2024-05-14 PROCEDURE — 99215 OFFICE O/P EST HI 40 MIN: CPT | Mod: S$PBB,,, | Performed by: NURSE PRACTITIONER

## 2024-05-14 PROCEDURE — 99999 PR PBB SHADOW E&M-EST. PATIENT-LVL III: CPT | Mod: PBBFAC,,, | Performed by: NURSE PRACTITIONER

## 2024-05-14 PROCEDURE — A4216 STERILE WATER/SALINE, 10 ML: HCPCS | Performed by: NURSE PRACTITIONER

## 2024-05-14 PROCEDURE — 99213 OFFICE O/P EST LOW 20 MIN: CPT | Mod: PBBFAC,25 | Performed by: NURSE PRACTITIONER

## 2024-05-14 RX ORDER — EPINEPHRINE 0.3 MG/.3ML
0.3 INJECTION SUBCUTANEOUS ONCE AS NEEDED
Status: CANCELLED | OUTPATIENT
Start: 2024-05-14

## 2024-05-14 RX ORDER — HEPARIN 100 UNIT/ML
500 SYRINGE INTRAVENOUS
Status: DISCONTINUED | OUTPATIENT
Start: 2024-05-14 | End: 2024-05-14 | Stop reason: HOSPADM

## 2024-05-14 RX ORDER — DIPHENHYDRAMINE HYDROCHLORIDE 50 MG/ML
50 INJECTION INTRAMUSCULAR; INTRAVENOUS ONCE AS NEEDED
Status: CANCELLED | OUTPATIENT
Start: 2024-05-14

## 2024-05-14 RX ORDER — DIPHENHYDRAMINE HYDROCHLORIDE 50 MG/ML
50 INJECTION INTRAMUSCULAR; INTRAVENOUS ONCE AS NEEDED
Status: DISCONTINUED | OUTPATIENT
Start: 2024-05-14 | End: 2024-05-14 | Stop reason: HOSPADM

## 2024-05-14 RX ORDER — EPINEPHRINE 0.3 MG/.3ML
0.3 INJECTION SUBCUTANEOUS ONCE AS NEEDED
Status: DISCONTINUED | OUTPATIENT
Start: 2024-05-14 | End: 2024-05-14 | Stop reason: HOSPADM

## 2024-05-14 RX ORDER — PREDNISONE 10 MG/1
10 TABLET ORAL DAILY
COMMUNITY
End: 2024-05-20

## 2024-05-14 RX ORDER — BUDESONIDE 180 UG/1
2 AEROSOL, POWDER RESPIRATORY (INHALATION) EVERY 12 HOURS
Qty: 1 EACH | Refills: 1 | Status: SHIPPED | OUTPATIENT
Start: 2024-05-14

## 2024-05-14 RX ORDER — HEPARIN 100 UNIT/ML
500 SYRINGE INTRAVENOUS
Status: CANCELLED | OUTPATIENT
Start: 2024-05-14

## 2024-05-14 RX ORDER — SODIUM CHLORIDE 0.9 % (FLUSH) 0.9 %
10 SYRINGE (ML) INJECTION
Status: CANCELLED | OUTPATIENT
Start: 2024-05-14

## 2024-05-14 RX ORDER — SODIUM CHLORIDE 0.9 % (FLUSH) 0.9 %
10 SYRINGE (ML) INJECTION
Status: DISCONTINUED | OUTPATIENT
Start: 2024-05-14 | End: 2024-05-14 | Stop reason: HOSPADM

## 2024-05-14 RX ADMIN — Medication 10 ML: at 03:05

## 2024-05-14 RX ADMIN — SODIUM CHLORIDE: 9 INJECTION, SOLUTION INTRAVENOUS at 02:05

## 2024-05-14 RX ADMIN — HEPARIN 500 UNITS: 100 SYRINGE at 03:05

## 2024-05-14 RX ADMIN — SODIUM CHLORIDE 1500 MG: 9 INJECTION, SOLUTION INTRAVENOUS at 02:05

## 2024-05-15 ENCOUNTER — TELEPHONE (OUTPATIENT)
Dept: HEMATOLOGY/ONCOLOGY | Facility: CLINIC | Age: 64
End: 2024-05-15
Payer: MEDICARE

## 2024-05-15 NOTE — TELEPHONE ENCOUNTER
Spoke to patient and advised RX sent to pharmacy of choice; she thanked me for calling.    Irena Bray Tracie L, HECTOR; Latisha Sharma, LPN  FYI-Just spoke with patient about her chest x-ray, she is scheduled with [facility] in the am for the testing. She inquired about her inhaler if it has been ordered and sent to her pharmacy?  I told her I would send a message to you.  Thanks

## 2024-05-20 ENCOUNTER — PATIENT MESSAGE (OUTPATIENT)
Dept: HEMATOLOGY/ONCOLOGY | Facility: CLINIC | Age: 64
End: 2024-05-20
Payer: MEDICARE

## 2024-05-20 DIAGNOSIS — C34.92 MALIGNANT NEOPLASM OF LEFT LUNG, UNSPECIFIED PART OF LUNG: Primary | ICD-10-CM

## 2024-05-20 RX ORDER — PREDNISONE 20 MG/1
TABLET ORAL
Qty: 60 TABLET | Refills: 0 | Status: SHIPPED | OUTPATIENT
Start: 2024-05-20 | End: 2024-06-11

## 2024-05-21 DIAGNOSIS — C34.92 MALIGNANT NEOPLASM OF LEFT LUNG, UNSPECIFIED PART OF LUNG: Primary | ICD-10-CM

## 2024-05-23 ENCOUNTER — PATIENT MESSAGE (OUTPATIENT)
Dept: HEMATOLOGY/ONCOLOGY | Facility: CLINIC | Age: 64
End: 2024-05-23
Payer: MEDICARE

## 2024-05-24 NOTE — PROGRESS NOTES
Subjective:       Patient ID: Rosanna Rich is a 63 y.o. female.    Chief Complaint: Follow-up (Patient is here for her 4 week visit. States that her walking O2 SAT is usually 79 - 82)    Referring: Self referral  Oncology: Dr. Nguyen    Diagnosis: Recurrent adenocarcinoma of lung overlapping sites    Current Treatment: OP DURVALUMAB 1500 MG Q4W                                           Clinical History:  This is a 62 y/o female with long standing history of recurrent adenocarcinoma of the lung. See above history and treatment.   She was last seen in this clinic as a second opinion in August of 2019. She continued her therapy in [place].   In August of 2023, she was diagnosed with her 4th recurrence in the RM.      Oncology History Overview Note   Treatment History:  2009-initial diagnosis. T2 N1 M0. S/P resection followed by adjuvant Cisplatin  8/11/2017: Second recurrence-T3 N0 adenocarcinoma RLL lobectomy. PDL-1 5%. Negative ALK. 4 cycles of adjuvant carboplatin and pemetrexed  8/30/2019: Recurrent T4 adenocarcinoma with pleural involvement, M1 lymph node gastrohepatic and left dome of liver.   9/19/19: Carboplatin/Taxol/Keytruda followed by Keytruda maintenance, last dose  between  4/ or 7/22/22---stopped due to pneumonitis  Pneumonitis-- was on Steroids  --SOB and oxygen  1/ 2023 new nodule  7mm/9mm---  8/2023 + PET still growing  Biopsy   9/27--- Adenocarcinoma     Malignant neoplasm of left lung   9/25/2023 Pathology Significant Finding    RM core biopsy- Focal moderately differentiated adenocarcinoma  PDL-1 58%     9/25/2023 Cancer Staged    Staging form: Lung, AJCC 8th Edition  - Clinical stage from 9/25/2023: Stage IIB (rcT1c, cN1, cM0)     10/18/2023 Imaging Significant Findings    10/18/2023: PET-CT:   Asymmetric left-sided adenoid and lingula tonsil involving central base of tongue SUV 14   Left upper lobe pulmonary mass 1.7 cm SUV of 7.9, no hilar or mediastinal uptake no pulmonary  parenchymal mass .    Left port, asymmetric capsular breast calcification on implant on the right, chronic interstitial lung disease, no lytic or blastic lesions the left upper lobe pulmonary nodules enlarged from 1.1 cm to 1.7 cm, consistent with growing neoplasm       1/3/2024 - 2/21/2024 Radiation Therapy    Start Treatment: 01-  End Treatment: 02-  Left lung/Mediast.   200 cGyRBE 35/35 7000/7000 cGyRBE=\   Proton 3-D plan   ==========================================================      1/3/2024 - 2/14/2024 Chemotherapy    Weekly carbo/taxol with xrt     1/23/2024 Pathology Significant Finding    MD VALDEZ ANISH: Mutation Analysis Precision Panel Interpretation and Report    KRAS c.35G>A p.G12D Exon 2 16% SNV - Missense   More than 5 genes. See details  in full report      2/23/2024 Research Study Participant    Research Study:    2/23/2024 -  TX-Chemotherapy/Biotherapy/Investigational/SMI (Autogenerated)      Treatment Plan     2019-1164  Autologous Lymphocyte Infusion Phase 2 (OP)      Chemotherapy summary: [No matching medication found in this treatment plan]      Treatment Dates: 2/23/2024 (Planned) to 4/5/2024 (Planned)   Line of Treatment: Solid: Metastatic, Second Line and Subsequent      Treatment Goal: Life Prolongation                  3/18/2024 Imaging Significant Findings    IMPRESSION:  Left upper lung nodules has decreased in size and metabolic activity. No new nodules or adenopathy.  Resolution of FDG avid left suprahilar lymph node.  ACTIONABLE ITEMS/RECOMMENDATIONS*: None.     3/19/2024 -  Chemotherapy    Plan Name: OP DURVALUMAB 1500 MG Q4W       Interval History:   05/29/2024   Patient presents with friend for lung cancer follow up. She received her first infusion at [facility]. She is scheduled for C4 6/11/24; this will be on hold.  As her last visit she complained of progressive shortness of breath chest x-ray showed infiltrates in the left mid lung.  She was started on high-dose  prednisone.  She is stable she was not getting any worse.  However,  She is winded when walking; reports o2 is 95 but drops in the upper 80's when she exerts herself.  She denies O2 at home.  Ordering chest xray   and drop prednisone to 60 mg with poor sugar control     Reviewed CBC with patient.  Patient has PET scheduled 7/8/24 via [facility].  Discussed future appointments and treatment.    Past Medical History:   Diagnosis Date    Anemia     Depression     Lung cancer       Past Surgical History:   Procedure Laterality Date    AUGMENTATION OF BREAST      COLONOSCOPY      HYSTERECTOMY      LOBECTOMY Right     R middle and R lower lobe    LUNG BIOPSY Left     PORTACATH PLACEMENT      TUBAL LIGATION       Social History     Socioeconomic History    Marital status:    Tobacco Use    Smoking status: Former     Types: Cigarettes    Smokeless tobacco: Never   Substance and Sexual Activity    Alcohol use: Yes     Comment: Social      Family History   Adopted: Yes   Problem Relation Name Age of Onset    Cancer Father          Lung      Review of patient's allergies indicates:  No Known Allergies   Review of Systems   Constitutional:  Negative for appetite change and unexpected weight change.   HENT:  Positive for mouth sores.    Eyes:  Negative for visual disturbance.   Respiratory:  Positive for shortness of breath. Negative for cough.    Cardiovascular:  Negative for chest pain.   Gastrointestinal:  Negative for abdominal pain and diarrhea.   Genitourinary:  Negative for frequency.   Musculoskeletal:  Negative for back pain.   Skin:  Positive for rash.   Neurological:  Positive for headaches.   Hematological:  Negative for adenopathy.   Psychiatric/Behavioral:  The patient is not nervous/anxious.        A complete 12 point ROS was performed in full with pertinent positives as described in interval history. Remainder of ROS done in full and are negative.        Objective:      Vitals:    05/29/24 1049    BP: 125/84   Pulse: 85   Temp: 98.1 °F (36.7 °C)        Physical Exam  Constitutional:       Appearance: Normal appearance.   HENT:      Head: Normocephalic and atraumatic.      Nose: Nose normal.      Mouth/Throat:      Mouth: Mucous membranes are moist.   Eyes:      Extraocular Movements: Extraocular movements intact.      Conjunctiva/sclera: Conjunctivae normal.      Pupils: Pupils are equal, round, and reactive to light.   Cardiovascular:      Rate and Rhythm: Normal rate and regular rhythm.      Pulses: Normal pulses.   Pulmonary:      Effort: Tachypnea and prolonged expiration present.      Breath sounds: Decreased air movement present. Examination of the left-lower field reveals decreased breath sounds. Decreased breath sounds present. No wheezing.   Abdominal:      General: Bowel sounds are normal.      Palpations: Abdomen is soft.   Musculoskeletal:      Cervical back: Normal range of motion and neck supple.   Skin:     Findings: Bruising present.      Comments: Areas of ecchymosis to forearms. Bruising to abdomen from diabetes monitor.    Neurological:      General: No focal deficit present.      Mental Status: She is alert and oriented to person, place, and time. Mental status is at baseline.   Psychiatric:         Mood and Affect: Mood normal.         Behavior: Behavior normal.         Thought Content: Thought content normal.         Judgment: Judgment normal.     LABS AND IMAGING REVIEWED IN EPIC      Assessment:   Recurrent adenocarcinoma to RM---new T1 vs M1a  lef  It appears from the reports this patient has 1 local area of recurrence of her adenocarcinoma.  However ,I explained to the patient that I need to review her definitive images.  We discussed her options if she only has 1 area of recurrent disease.  Surgery, radiation.I would be reluctant to resume systemic immunotherapy given her degree of intolerance to immunotherapy with pneumonitis diabetes, and adrenal insufficiency.    RA/Discoid  "lupus-followed by rheumatology. 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  Resolved now with xray progress  Hypothyroidism  Risk of pneumonitis due to prior history-prescribed QVAR but $$$$. Changed to pulmicort  Chronic PEREZ    Plan:   Her first durvalumab was on 3/19/24 at [facility]. Plan q4 weeks x1 year.   She is adamant about continuing treatment q4w at this time. I have offered q2w. She would like to periodically check her lungs for infiltrates to give her "peace of mind"   Wean prednisone to 60   X-ray today   Consider bronchoscopy and CT   She will repeat PET and MRI with [facility] in 7/19/24  CBC, CMP, on return  TSH in 8 weeks  Home oxygen  resume  I, Latisha Hernández LPN, acted solely as a scribe for and in the presence of, Dr. Miguel Chisholm who performed the service.        Follow up in about 13 days (around 6/11/2024) for Labs, TD with [facility] and same day infusion.       Orders Placed This Encounter    X-Ray Chest PA And Lateral    Comprehensive Metabolic Panel    CBC Auto Differential          Miguel Chisholm MD          "

## 2024-05-28 PROBLEM — R91.8 PULMONARY INFILTRATE ON CHEST X-RAY: Status: ACTIVE | Noted: 2024-05-28

## 2024-05-29 ENCOUNTER — HOSPITAL ENCOUNTER (OUTPATIENT)
Dept: RADIOLOGY | Facility: HOSPITAL | Age: 64
Discharge: HOME | End: 2024-05-29
Attending: INTERNAL MEDICINE
Payer: MEDICARE

## 2024-05-29 ENCOUNTER — OFFICE VISIT (OUTPATIENT)
Dept: HEMATOLOGY/ONCOLOGY | Facility: CLINIC | Age: 64
End: 2024-05-29
Payer: MEDICARE

## 2024-05-29 VITALS
HEIGHT: 65 IN | HEART RATE: 85 BPM | TEMPERATURE: 98 F | SYSTOLIC BLOOD PRESSURE: 125 MMHG | WEIGHT: 245.13 LBS | BODY MASS INDEX: 40.84 KG/M2 | OXYGEN SATURATION: 95 % | DIASTOLIC BLOOD PRESSURE: 84 MMHG

## 2024-05-29 DIAGNOSIS — C34.92 MALIGNANT NEOPLASM OF LEFT LUNG, UNSPECIFIED PART OF LUNG: Primary | ICD-10-CM

## 2024-05-29 DIAGNOSIS — R91.8 PULMONARY INFILTRATE ON CHEST X-RAY: ICD-10-CM

## 2024-05-29 DIAGNOSIS — C34.92 MALIGNANT NEOPLASM OF LEFT LUNG, UNSPECIFIED PART OF LUNG: ICD-10-CM

## 2024-05-29 PROCEDURE — 71046 X-RAY EXAM CHEST 2 VIEWS: CPT | Mod: TC

## 2024-05-29 PROCEDURE — 99213 OFFICE O/P EST LOW 20 MIN: CPT | Mod: PBBFAC,25 | Performed by: INTERNAL MEDICINE

## 2024-05-29 PROCEDURE — 99214 OFFICE O/P EST MOD 30 MIN: CPT | Mod: S$PBB,,, | Performed by: INTERNAL MEDICINE

## 2024-05-29 PROCEDURE — 99999 PR PBB SHADOW E&M-EST. PATIENT-LVL III: CPT | Mod: PBBFAC,,, | Performed by: INTERNAL MEDICINE

## 2024-05-30 ENCOUNTER — TELEPHONE (OUTPATIENT)
Dept: HEMATOLOGY/ONCOLOGY | Facility: CLINIC | Age: 64
End: 2024-05-30
Payer: MEDICARE

## 2024-05-30 NOTE — TELEPHONE ENCOUNTER
Spoke to [facility] office whom deliver O2; advised they do not file insurance and deferred order to [employer] in [place].  Spoke to [employer], faxed order and they will contact patient ASAP to arrange.

## 2024-05-31 NOTE — PROGRESS NOTES
Subjective:       Patient ID: Rosanna Rich is a 63 y.o. female.    Chief Complaint: Follow-up (Patient is here for her follow up visit. Wants to know if she can get a smaller O2 tank. Still having trouble breathing while walking with O2 tank)    Referring: Self referral  Oncology: Dr. Nguyen    Diagnosis: Recurrent adenocarcinoma of lung overlapping sites    Current Treatment: OP DURVALUMAB 1500 MG Q4W                                           Clinical History:  This is a 64 y/o female with long standing history of recurrent adenocarcinoma of the lung. See above history and treatment.   She was last seen in this clinic as a second opinion in August of 2019. She continued her therapy in [place].   In August of 2023, she was diagnosed with her 4th recurrence in the [facility].      Oncology History Overview Note   Treatment History:  2009-initial diagnosis. T2 N1 M0. S/P resection followed by adjuvant Cisplatin  8/11/2017: Second recurrence-T3 N0 adenocarcinoma RLL lobectomy. PDL-1 5%. Negative ALK. 4 cycles of adjuvant carboplatin and pemetrexed  8/30/2019: Recurrent T4 adenocarcinoma with pleural involvement, M1 lymph node gastrohepatic and left dome of liver.   9/19/19: Carboplatin/Taxol/Keytruda followed by Keytruda maintenance, last dose  between  4/ or 7/22/22---stopped due to pneumonitis  Pneumonitis-- was on Steroids  --SOB and oxygen  1/ 2023 new nodule  7mm/9mm---  8/2023 + PET still growing  Biopsy   9/27--- Adenocarcinoma     Malignant neoplasm of left lung   9/25/2023 Pathology Significant Finding    [facility] core biopsy- Focal moderately differentiated adenocarcinoma  PDL-1 58%     9/25/2023 Cancer Staged    Staging form: Lung, AJCC 8th Edition  - Clinical stage from 9/25/2023: Stage IIB (rcT1c, cN1, cM0)     10/18/2023 Imaging Significant Findings    10/18/2023: PET-CT:   Asymmetric left-sided adenoid and lingula tonsil involving central base of tongue SUV 14   Left upper lobe pulmonary mass 1.7 cm  "SUV of 7.9, no hilar or mediastinal uptake no pulmonary parenchymal mass .    Left port, asymmetric capsular breast calcification on implant on the right, chronic interstitial lung disease, no lytic or blastic lesions the left upper lobe pulmonary nodules enlarged from 1.1 cm to 1.7 cm, consistent with growing neoplasm       1/3/2024 - 2/21/2024 Radiation Therapy    Start Treatment: 01-  End Treatment: 02-  Left lung/Mediast.   200 cGyRBE 35/35 7000/7000 cGyRBE=\   Proton 3-D plan   ==========================================================      1/3/2024 - 2/14/2024 Chemotherapy    Weekly carbo/taxol with xrt     1/23/2024 Pathology Significant Finding    [facility] ANISH: Mutation Analysis Precision Panel Interpretation and Report    KRAS c.35G>A p.G12D Exon 2 16% SNV - Missense   More than 5 genes. See details  in full report      2/23/2024 Research Study Participant    Research Study:    2/23/2024 -  TX-Chemotherapy/Biotherapy/Investigational/SMI (Autogenerated)      Treatment Plan     2019-1164  Autologous Lymphocyte Infusion Phase 2 (OP)      Chemotherapy summary: [No matching medication found in this treatment plan]      Treatment Dates: 2/23/2024 (Planned) to 4/5/2024 (Planned)   Line of Treatment: Solid: Metastatic, Second Line and Subsequent      Treatment Goal: Life Prolongation                  3/18/2024 Imaging Significant Findings    IMPRESSION:  Left upper lung nodules has decreased in size and metabolic activity. No new nodules or adenopathy.  Resolution of FDG avid left suprahilar lymph node.  ACTIONABLE ITEMS/RECOMMENDATIONS*: None.     3/19/2024 -  Chemotherapy    Plan Name: OP DURVALUMAB 1500 MG Q4W       Interval History:   06/03/2024   Patient presents with friend for lung cancer follow up.  She is scheduled for IVF today.  She continues to "struggle to breath" despite being on continuous O2 - 2L per NC.  Will call pharmacy to request small. Portable O2.  Denies elevated " temperature and falls.  Advised to take 60 mg steroids and will send RX of Augmentin.  Advised not to take O2 off and take her Pepcid, Nexium or Prilosec RX for heart burn.  Encouraged walking and deep breathing exercise.  Advised to stay off aspirin/NSAID.    Reviewed CT of chest with patient  05/30/2024: CT scan: Atelectasis as well as air bronchograms seen in the left upper medial lung --where a mass was previously there is infiltrative change beyond this.    Discussed future appointments and no immunotherapy.    Past Medical History:   Diagnosis Date    Anemia     Depression     Lung cancer       Past Surgical History:   Procedure Laterality Date    AUGMENTATION OF BREAST      COLONOSCOPY      HYSTERECTOMY      LOBECTOMY Right     R middle and R lower lobe    LUNG BIOPSY Left     PORTACATH PLACEMENT      TUBAL LIGATION       Social History     Socioeconomic History    Marital status:    Tobacco Use    Smoking status: Former     Types: Cigarettes    Smokeless tobacco: Never   Substance and Sexual Activity    Alcohol use: Yes     Comment: Social      Family History   Adopted: Yes   Problem Relation Name Age of Onset    Cancer Father          Lung      Review of patient's allergies indicates:  No Known Allergies   Review of Systems   Constitutional:  Negative for appetite change and unexpected weight change.   HENT:  Positive for mouth sores.    Eyes:  Negative for visual disturbance.   Respiratory:  Positive for shortness of breath. Negative for cough.    Cardiovascular:  Negative for chest pain.   Gastrointestinal:  Negative for abdominal pain and diarrhea.   Genitourinary:  Negative for frequency.   Musculoskeletal:  Negative for back pain.   Skin:  Positive for rash.   Neurological:  Positive for headaches.   Hematological:  Negative for adenopathy.   Psychiatric/Behavioral:  The patient is not nervous/anxious.        A complete 12 point ROS was performed in full with pertinent positives as described in  interval history. Remainder of ROS done in full and are negative.        Objective:      Vitals:    06/03/24 0951   BP: 122/72   Pulse: 80   Temp: 98.1 °F (36.7 °C)          Physical Exam  Constitutional:       Appearance: Normal appearance.   HENT:      Head: Normocephalic and atraumatic.      Nose: Nose normal.      Mouth/Throat:      Mouth: Mucous membranes are moist.   Eyes:      Extraocular Movements: Extraocular movements intact.      Conjunctiva/sclera: Conjunctivae normal.      Pupils: Pupils are equal, round, and reactive to light.   Cardiovascular:      Rate and Rhythm: Normal rate and regular rhythm.      Pulses: Normal pulses.   Pulmonary:      Effort: Tachypnea and prolonged expiration present.      Breath sounds: Decreased air movement present. Examination of the left-lower field reveals decreased breath sounds. Decreased breath sounds present. No wheezing.   Abdominal:      General: Bowel sounds are normal.      Palpations: Abdomen is soft.   Musculoskeletal:      Cervical back: Normal range of motion and neck supple.   Skin:     Findings: Bruising present.      Comments: Areas of ecchymosis to forearms. Bruising to abdomen from diabetes monitor.    Neurological:      General: No focal deficit present.      Mental Status: She is alert and oriented to person, place, and time. Mental status is at baseline.   Psychiatric:         Mood and Affect: Mood normal.         Behavior: Behavior normal.         Thought Content: Thought content normal.         Judgment: Judgment normal.       LABS AND IMAGING REVIEWED IN EPIC      Assessment:   Recurrent adenocarcinoma to RM---new T1 vs M1a  lef  It appears from the reports this patient has 1 local area of recurrence of her adenocarcinoma.  However ,I explained to the patient that I need to review her definitive images.  We discussed her options if she only has 1 area of recurrent disease.  Surgery, radiation.I would be reluctant to resume systemic immunotherapy  given her degree of intolerance to immunotherapy with pneumonitis diabetes, and adrenal insufficiency.    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 --  Hypothyroidism  Risk of pneumonitis due to prior history-prescribed QVAR but $$$$. Changed to pulmicort  Chronic PEREZ    Plan:   Her first durvalumab was on 3/19/24 at [facility]. Plan q4 weeks x1 year.      prednisone to 60   Add Augmentin x1 week   After Augmentin put on Bactrim 3 times a week   TeleMed visit next week if not improving  Consider bronchoscopy   She will repeat PET and MRI with [facility] in 7/19/24  CBC, CMP, on return  TSH in 4 weeks  No NSAIDs, hold aspirin, IV fluids today       Miguel Chisholm MD

## 2024-06-03 ENCOUNTER — PATIENT MESSAGE (OUTPATIENT)
Dept: HEMATOLOGY/ONCOLOGY | Facility: CLINIC | Age: 64
End: 2024-06-03

## 2024-06-03 ENCOUNTER — INFUSION (OUTPATIENT)
Dept: INFUSION THERAPY | Facility: HOSPITAL | Age: 64
End: 2024-06-03
Attending: INTERNAL MEDICINE
Payer: MEDICARE

## 2024-06-03 ENCOUNTER — OFFICE VISIT (OUTPATIENT)
Dept: HEMATOLOGY/ONCOLOGY | Facility: CLINIC | Age: 64
End: 2024-06-03
Payer: MEDICARE

## 2024-06-03 VITALS
OXYGEN SATURATION: 95 % | WEIGHT: 247.56 LBS | HEART RATE: 80 BPM | HEIGHT: 65 IN | TEMPERATURE: 98 F | BODY MASS INDEX: 41.25 KG/M2 | DIASTOLIC BLOOD PRESSURE: 72 MMHG | SYSTOLIC BLOOD PRESSURE: 122 MMHG

## 2024-06-03 DIAGNOSIS — R79.89 ELEVATED SERUM CREATININE: ICD-10-CM

## 2024-06-03 DIAGNOSIS — R91.8 PULMONARY INFILTRATE ON CHEST X-RAY: ICD-10-CM

## 2024-06-03 DIAGNOSIS — R79.89 ELEVATED SERUM CREATININE: Primary | ICD-10-CM

## 2024-06-03 DIAGNOSIS — C34.92 MALIGNANT NEOPLASM OF LEFT LUNG, UNSPECIFIED PART OF LUNG: Primary | ICD-10-CM

## 2024-06-03 PROCEDURE — 63600175 PHARM REV CODE 636 W HCPCS: Performed by: INTERNAL MEDICINE

## 2024-06-03 PROCEDURE — A4216 STERILE WATER/SALINE, 10 ML: HCPCS | Performed by: INTERNAL MEDICINE

## 2024-06-03 PROCEDURE — 99214 OFFICE O/P EST MOD 30 MIN: CPT | Mod: PBBFAC | Performed by: INTERNAL MEDICINE

## 2024-06-03 PROCEDURE — 25000003 PHARM REV CODE 250: Performed by: INTERNAL MEDICINE

## 2024-06-03 PROCEDURE — 96360 HYDRATION IV INFUSION INIT: CPT

## 2024-06-03 PROCEDURE — 99999 PR PBB SHADOW E&M-EST. PATIENT-LVL IV: CPT | Mod: PBBFAC,,, | Performed by: INTERNAL MEDICINE

## 2024-06-03 PROCEDURE — 99214 OFFICE O/P EST MOD 30 MIN: CPT | Mod: S$PBB,,, | Performed by: INTERNAL MEDICINE

## 2024-06-03 RX ORDER — SODIUM CHLORIDE 0.9 % (FLUSH) 0.9 %
10 SYRINGE (ML) INJECTION
Status: CANCELLED | OUTPATIENT
Start: 2024-06-03

## 2024-06-03 RX ORDER — SODIUM CHLORIDE 0.9 % (FLUSH) 0.9 %
10 SYRINGE (ML) INJECTION
OUTPATIENT
Start: 2024-06-03

## 2024-06-03 RX ORDER — HEPARIN 100 UNIT/ML
500 SYRINGE INTRAVENOUS
Status: CANCELLED | OUTPATIENT
Start: 2024-06-03

## 2024-06-03 RX ORDER — HEPARIN 100 UNIT/ML
500 SYRINGE INTRAVENOUS
Status: DISCONTINUED | OUTPATIENT
Start: 2024-06-03 | End: 2024-06-03 | Stop reason: HOSPADM

## 2024-06-03 RX ORDER — AMOXICILLIN AND CLAVULANATE POTASSIUM 500; 125 MG/1; MG/1
1 TABLET, FILM COATED ORAL 2 TIMES DAILY
Qty: 14 TABLET | Refills: 0 | Status: SHIPPED | OUTPATIENT
Start: 2024-06-03 | End: 2024-06-11

## 2024-06-03 RX ORDER — SODIUM CHLORIDE 0.9 % (FLUSH) 0.9 %
10 SYRINGE (ML) INJECTION
Status: DISCONTINUED | OUTPATIENT
Start: 2024-06-03 | End: 2024-06-03 | Stop reason: HOSPADM

## 2024-06-03 RX ORDER — HEPARIN 100 UNIT/ML
500 SYRINGE INTRAVENOUS
OUTPATIENT
Start: 2024-06-03

## 2024-06-03 RX ORDER — PREDNISONE 20 MG/1
60 TABLET ORAL DAILY
Qty: 90 TABLET | Refills: 2 | Status: SHIPPED | OUTPATIENT
Start: 2024-06-03

## 2024-06-03 RX ADMIN — HEPARIN 500 UNITS: 100 SYRINGE at 11:06

## 2024-06-03 RX ADMIN — SODIUM CHLORIDE 1000 ML: 9 INJECTION, SOLUTION INTRAVENOUS at 10:06

## 2024-06-03 RX ADMIN — SODIUM CHLORIDE, PRESERVATIVE FREE 10 ML: 5 INJECTION INTRAVENOUS at 11:06

## 2024-06-03 NOTE — NURSING
Patient arrived ambulatory to infusion from provider appt.  Pt is alert and oriented with no acute complaints at this time. Pt is here for 1L NS, pt tolerated infusion well.    Antonieta Christine RN

## 2024-06-07 NOTE — PROGRESS NOTES
Subjective:       Patient ID: Rosanna Rich is a 63 y.o. female.    Chief Complaint: Follow-up (2 week virtual visit, pt finished her Augmentin wants to know if she needs to start the bactrim  )    Referring: Self referral  Oncology: Dr. Nguyen    Diagnosis: Recurrent adenocarcinoma of lung overlapping sites    Current Treatment: OP DURVALUMAB 1500 MG Q4W ---on hold                                          Clinical History:  This is a 64 y/o female with long standing history of recurrent adenocarcinoma of the lung. See above history and treatment.   She was last seen in this clinic as a second opinion in August of 2019. She continued her therapy in [place].   In August of 2023, she was diagnosed with her 4th recurrence in the RM.      Oncology History Overview Note   Treatment History:  2009-initial diagnosis. T2 N1 M0. S/P resection followed by adjuvant Cisplatin  8/11/2017: Second recurrence-T3 N0 adenocarcinoma RLL lobectomy. PDL-1 5%. Negative ALK. 4 cycles of adjuvant carboplatin and pemetrexed  8/30/2019: Recurrent T4 adenocarcinoma with pleural involvement, M1 lymph node gastrohepatic and left dome of liver.   9/19/19: Carboplatin/Taxol/Keytruda followed by Keytruda maintenance, last dose  between  4/ or 7/22/22---stopped due to pneumonitis  Pneumonitis-- was on Steroids  --SOB and oxygen  1/ 2023 new nodule  7mm/9mm---  8/2023 + PET still growing  Biopsy   9/27--- Adenocarcinoma     Malignant neoplasm of left lung   9/25/2023 Pathology Significant Finding    [facility] core biopsy- Focal moderately differentiated adenocarcinoma  PDL-1 58%     9/25/2023 Cancer Staged    Staging form: Lung, AJCC 8th Edition  - Clinical stage from 9/25/2023: Stage IIB (rcT1c, cN1, cM0)     10/18/2023 Imaging Significant Findings    10/18/2023: PET-CT:   Asymmetric left-sided adenoid and lingula tonsil involving central base of tongue SUV 14   Left upper lobe pulmonary mass 1.7 cm SUV of 7.9, no hilar or mediastinal  uptake no pulmonary parenchymal mass .    Left port, asymmetric capsular breast calcification on implant on the right, chronic interstitial lung disease, no lytic or blastic lesions the left upper lobe pulmonary nodules enlarged from 1.1 cm to 1.7 cm, consistent with growing neoplasm       1/3/2024 - 2/21/2024 Radiation Therapy    Start Treatment: 01-  End Treatment: 02-  Left lung/Mediast.   200 cGyRBE 35/35 7000/7000 cGyRBE=\   Proton 3-D plan   ==========================================================      1/3/2024 - 2/14/2024 Chemotherapy    Weekly carbo/taxol with xrt     1/23/2024 Pathology Significant Finding    [facility] ANISH: Mutation Analysis Precision Panel Interpretation and Report    KRAS c.35G>A p.G12D Exon 2 16% SNV - Missense   More than 5 genes. See details  in full report      2/23/2024 Research Study Participant    Research Study:    2/23/2024 -  [place]-Chemotherapy/Biotherapy/Investigational/SMI (Autogenerated)      Treatment Plan     2019-1164  Autologous Lymphocyte Infusion Phase 2 (OP)      Chemotherapy summary: [No matching medication found in this treatment plan]      Treatment Dates: 2/23/2024 (Planned) to 4/5/2024 (Planned)   Line of Treatment: Solid: Metastatic, Second Line and Subsequent      Treatment Goal: Life Prolongation                  3/18/2024 Imaging Significant Findings    IMPRESSION:  Left upper lung nodules has decreased in size and metabolic activity. No new nodules or adenopathy.  Resolution of FDG avid left suprahilar lymph node.  ACTIONABLE ITEMS/RECOMMENDATIONS*: None.     3/19/2024 -  Chemotherapy    Plan Name: OP DURVALUMAB 1500 MG Q4W       Interval History:   06/11/2024   Patient presents with friend for lung cancer follow up.    Since our last visit her prednisone went down to 60.  She is completed Augmentin.    She was able to take bigger deeper breasts, her O2 sats are staying in the 90s now as opposed to seeing some in the 80s.  She still gets short  of breath on exertion.  She was using her inhalers, she denies any fever.  Her productive cough.  We encouraged her using her incentive spirometer.    And we discussed starting the Bactrim now that she has off of the Augmentin.    She has not allergic to sulfa.    We discussed that if she gets worse with increasing shortness of breath to report to the emergency room.  We discussed bronchoscopy and biopsy.        Past Medical History:   Diagnosis Date    Anemia     Depression     Lung cancer       Past Surgical History:   Procedure Laterality Date    AUGMENTATION OF BREAST      COLONOSCOPY      HYSTERECTOMY      LOBECTOMY Right     R middle and R lower lobe    LUNG BIOPSY Left     PORTACATH PLACEMENT      TUBAL LIGATION       Social History     Socioeconomic History    Marital status:    Tobacco Use    Smoking status: Former     Types: Cigarettes    Smokeless tobacco: Never   Substance and Sexual Activity    Alcohol use: Yes     Comment: Social      Family History   Adopted: Yes   Problem Relation Name Age of Onset    Cancer Father          Lung      Review of patient's allergies indicates:  No Known Allergies   Review of Systems   Constitutional:  Negative for appetite change and unexpected weight change.   HENT:  Negative for mouth sores.    Eyes:  Negative for visual disturbance.   Respiratory:  Positive for shortness of breath. Negative for cough.    Cardiovascular:  Negative for chest pain.   Gastrointestinal:  Negative for abdominal pain and diarrhea.   Genitourinary:  Negative for frequency.   Musculoskeletal:  Negative for back pain.   Skin:  Negative for rash.   Neurological:  Negative for headaches.   Hematological:  Negative for adenopathy.   Psychiatric/Behavioral:  The patient is not nervous/anxious.        A complete 12 point ROS was performed in full with pertinent positives as described in interval history. Remainder of ROS done in full and are negative.        Objective:      There were no  vitals filed for this visit.     Physical Exam  Constitutional:       Comments: Still minimally tachypneic, on the video phone call, wearing oxygen   Pulmonary:      Effort: Tachypnea present.   Skin:     Comments: Areas of ecchymosis to forearms. Bruising to abdomen from diabetes monitor.    Neurological:      General: No focal deficit present.      Mental Status: She is alert and oriented to person, place, and time.   Psychiatric:         Mood and Affect: Mood normal.     LABS AND IMAGING REVIEWED IN EPIC      Assessment:   Recurrent adenocarcinoma to RM---new T1 vs M1a  lef  It appears from the reports this patient has 1 local area of recurrence of her adenocarcinoma.  However ,I explained to the patient that I need to review her definitive images.  We discussed her options if she only has 1 area of recurrent disease.  Surgery, radiation.I would be reluctant to resume systemic immunotherapy given her degree of intolerance to immunotherapy with pneumonitis diabetes, and adrenal insufficiency.    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 --  Hypothyroidism  Risk of pneumonitis due to prior history-prescribed QVAR but $$$$. Changed to pulmicort  Chronic PEREZ    Plan:   Her first durvalumab was on 3/19/24 at [facility]. Plan q4 weeks x1 year.      prednisone to 60   Bactrim 3 times a week   Return to clinic in 2 weeks with CBC, CMP chest x-ray  Consider bronchoscopy if worse  She will repeat PET and MRI with [facility] in 7/19/24  CBC, CMP, on return  TSH in 4 weeks  No NSAIDs, hold aspirin,      Miguel Chisholm MD    Telemed: This visit was a telemedicine/telephone visit.  Real-time audio and video were used following [facility] protocols.  The patient and/or family agreed to the security of information at the location.  I was located in the [facility] with  this visit occurred.  The patient was located.  At home  I am licensed to practice in the [facility].  Total time 25 minutes

## 2024-06-11 ENCOUNTER — OFFICE VISIT (OUTPATIENT)
Dept: HEMATOLOGY/ONCOLOGY | Facility: CLINIC | Age: 64
End: 2024-06-11
Payer: MEDICARE

## 2024-06-11 DIAGNOSIS — R91.8 PULMONARY INFILTRATE ON CHEST X-RAY: ICD-10-CM

## 2024-06-11 DIAGNOSIS — C34.92 MALIGNANT NEOPLASM OF LEFT LUNG, UNSPECIFIED PART OF LUNG: Primary | ICD-10-CM

## 2024-06-11 PROCEDURE — 99213 OFFICE O/P EST LOW 20 MIN: CPT | Mod: 95,,, | Performed by: INTERNAL MEDICINE

## 2024-06-11 RX ORDER — SULFAMETHOXAZOLE AND TRIMETHOPRIM 800; 160 MG/1; MG/1
1 TABLET ORAL
Qty: 12 TABLET | Refills: 1 | Status: SHIPPED | OUTPATIENT
Start: 2024-06-12

## 2024-06-12 ENCOUNTER — PATIENT MESSAGE (OUTPATIENT)
Dept: HEMATOLOGY/ONCOLOGY | Facility: CLINIC | Age: 64
End: 2024-06-12
Payer: MEDICARE

## 2024-06-26 ENCOUNTER — HOSPITAL ENCOUNTER (OUTPATIENT)
Dept: RADIOLOGY | Facility: HOSPITAL | Age: 64
Discharge: HOME | End: 2024-06-26
Attending: INTERNAL MEDICINE
Payer: MEDICARE

## 2024-06-26 ENCOUNTER — OFFICE VISIT (OUTPATIENT)
Dept: HEMATOLOGY/ONCOLOGY | Facility: CLINIC | Age: 64
End: 2024-06-26
Payer: MEDICARE

## 2024-06-26 VITALS
OXYGEN SATURATION: 96 % | HEART RATE: 84 BPM | SYSTOLIC BLOOD PRESSURE: 129 MMHG | BODY MASS INDEX: 41.84 KG/M2 | WEIGHT: 251.13 LBS | TEMPERATURE: 98 F | HEIGHT: 65 IN | DIASTOLIC BLOOD PRESSURE: 70 MMHG

## 2024-06-26 DIAGNOSIS — R91.8 PULMONARY INFILTRATE ON CHEST X-RAY: ICD-10-CM

## 2024-06-26 DIAGNOSIS — Z79.899 IMMUNODEFICIENCY DUE TO DRUGS: ICD-10-CM

## 2024-06-26 DIAGNOSIS — J98.4 PNEUMONITIS: ICD-10-CM

## 2024-06-26 DIAGNOSIS — D84.821 IMMUNODEFICIENCY DUE TO DRUGS: ICD-10-CM

## 2024-06-26 DIAGNOSIS — C34.92 MALIGNANT NEOPLASM OF LEFT LUNG, UNSPECIFIED PART OF LUNG: Primary | ICD-10-CM

## 2024-06-26 DIAGNOSIS — C34.92 MALIGNANT NEOPLASM OF LEFT LUNG, UNSPECIFIED PART OF LUNG: ICD-10-CM

## 2024-06-26 DIAGNOSIS — R79.89 ELEVATED SERUM CREATININE: ICD-10-CM

## 2024-06-26 DIAGNOSIS — Z99.81 SUPPLEMENTAL OXYGEN DEPENDENT: ICD-10-CM

## 2024-06-26 DIAGNOSIS — E03.2 HYPOTHYROIDISM DUE TO MEDICATION: ICD-10-CM

## 2024-06-26 PROCEDURE — 99999 PR PBB SHADOW E&M-EST. PATIENT-LVL III: CPT | Mod: PBBFAC,,, | Performed by: NURSE PRACTITIONER

## 2024-06-26 PROCEDURE — 99213 OFFICE O/P EST LOW 20 MIN: CPT | Mod: PBBFAC,25 | Performed by: NURSE PRACTITIONER

## 2024-06-26 PROCEDURE — 99215 OFFICE O/P EST HI 40 MIN: CPT | Mod: S$PBB,,, | Performed by: NURSE PRACTITIONER

## 2024-06-26 PROCEDURE — 71046 X-RAY EXAM CHEST 2 VIEWS: CPT | Mod: TC

## 2024-06-26 NOTE — PROGRESS NOTES
Subjective:       Patient ID: Rosanna Rich is a 63 y.o. female.    Chief Complaint: Follow-up (Patient is here for her 2 week visit)    Referring: Self referral  Oncology: Dr. Nguyen    Diagnosis: Recurrent adenocarcinoma of lung overlapping sites    Current Treatment: OP DURVALUMAB 1500 MG Q4W ---on hold                                          Clinical History:  This is a 64 y/o female with long standing history of recurrent adenocarcinoma of the lung. See above history and treatment.   She was last seen in this clinic as a second opinion in August of 2019. She continued her therapy in [place].   In August of 2023, she was diagnosed with her 4th recurrence in the [facility].      Oncology History Overview Note   Treatment History:  2009-initial diagnosis. T2 N1 M0. S/P resection followed by adjuvant Cisplatin  8/11/2017: Second recurrence-T3 N0 adenocarcinoma RLL lobectomy. PDL-1 5%. Negative ALK. 4 cycles of adjuvant carboplatin and pemetrexed  8/30/2019: Recurrent T4 adenocarcinoma with pleural involvement, M1 lymph node gastrohepatic and left dome of liver.   9/19/19: Carboplatin/Taxol/Keytruda followed by Keytruda maintenance, last dose  between  4/ or 7/22/22---stopped due to pneumonitis  Pneumonitis-- was on Steroids  --SOB and oxygen  1/ 2023 new nodule  7mm/9mm---  8/2023 + PET still growing  Biopsy   9/27--- Adenocarcinoma     Malignant neoplasm of left lung   9/25/2023 Pathology Significant Finding    [facility] core biopsy- Focal moderately differentiated adenocarcinoma  PDL-1 58%     9/25/2023 Cancer Staged    Staging form: Lung, AJCC 8th Edition  - Clinical stage from 9/25/2023: Stage IIB (rcT1c, cN1, cM0)     10/18/2023 Imaging Significant Findings    10/18/2023: PET-CT:   Asymmetric left-sided adenoid and lingula tonsil involving central base of tongue SUV 14   Left upper lobe pulmonary mass 1.7 cm SUV of 7.9, no hilar or mediastinal uptake no pulmonary parenchymal mass .    Left port, asymmetric  capsular breast calcification on implant on the right, chronic interstitial lung disease, no lytic or blastic lesions the left upper lobe pulmonary nodules enlarged from 1.1 cm to 1.7 cm, consistent with growing neoplasm       1/3/2024 - 2/21/2024 Radiation Therapy    Start Treatment: 01-  End Treatment: 02-  Left lung/Mediast.   200 cGyRBE 35/35 7000/7000 cGyRBE=\   Proton 3-D plan   ==========================================================      1/3/2024 - 2/14/2024 Chemotherapy    Weekly carbo/taxol with xrt     1/23/2024 Pathology Significant Finding    MD VALDEZ ANISH: Mutation Analysis Precision Panel Interpretation and Report    KRAS c.35G>A p.G12D Exon 2 16% SNV - Missense   More than 5 genes. See details  in full report      2/23/2024 Research Study Participant    Research Study:    2/23/2024 -  TX-Chemotherapy/Biotherapy/Investigational/SMI (Autogenerated)      Treatment Plan     2019-1164  Autologous Lymphocyte Infusion Phase 2 (OP)      Chemotherapy summary: [No matching medication found in this treatment plan]      Treatment Dates: 2/23/2024 (Planned) to 4/5/2024 (Planned)   Line of Treatment: Solid: Metastatic, Second Line and Subsequent      Treatment Goal: Life Prolongation                  3/18/2024 Imaging Significant Findings    IMPRESSION:  Left upper lung nodules has decreased in size and metabolic activity. No new nodules or adenopathy.  Resolution of FDG avid left suprahilar lymph node.  ACTIONABLE ITEMS/RECOMMENDATIONS*: None.     3/19/2024 -  Chemotherapy    Plan Name: OP DURVALUMAB 1500 MG Q4W     5/30/2024 Imaging Significant Findings    05/30/2024: CT scan: Atelectasis as well as air bronchograms seen in the left upper medial lung with a mass was previously there is infiltrative change beyond this.       Interval History:   06/26/2024   Patient presents with friend for lung cancer and immune related pneumonitis. She was started on prednisone 80mg per day with minimal  "improvement. She was tapered to 60mg and is currently at 60mg. She cannot go more than a few minutes without her oxygen.  Her O2 sats run in the low 80s on exertion. Her lungs "are stiff and hurt" in the am.   Her SOB is worse on exertion. She denies productive cough and fever. She does not feel bad, "just can't breathe"  Her DM is managed with increasing insulin dosing.      Past Medical History:   Diagnosis Date    Anemia     Depression     Lung cancer       Past Surgical History:   Procedure Laterality Date    AUGMENTATION OF BREAST      COLONOSCOPY      HYSTERECTOMY      LOBECTOMY Right     R middle and R lower lobe    LUNG BIOPSY Left     PORTACATH PLACEMENT      TUBAL LIGATION       Social History     Socioeconomic History    Marital status:    Tobacco Use    Smoking status: Former     Types: Cigarettes    Smokeless tobacco: Never   Substance and Sexual Activity    Alcohol use: Yes     Comment: Social      Family History   Adopted: Yes   Problem Relation Name Age of Onset    Cancer Father          Lung      Review of patient's allergies indicates:  No Known Allergies   Review of Systems   Constitutional:  Negative for appetite change and unexpected weight change.   HENT:  Negative for mouth sores.    Eyes:  Negative for visual disturbance.   Respiratory:  Positive for shortness of breath. Negative for cough.    Cardiovascular:  Negative for chest pain.   Gastrointestinal:  Negative for abdominal pain and diarrhea.   Genitourinary:  Negative for frequency.   Musculoskeletal:  Negative for back pain.   Skin:  Negative for rash.   Neurological:  Negative for headaches.   Hematological:  Negative for adenopathy.   Psychiatric/Behavioral:  The patient is not nervous/anxious.        A complete 12 point ROS was performed in full with pertinent positives as described in interval history. Remainder of ROS done in full and are negative.        Objective:      Vitals:    06/26/24 1044   BP: 129/70   Pulse: 84 "   Temp: 98.2 °F (36.8 °C)        Physical Exam  Constitutional:       Appearance: Normal appearance.   HENT:      Head: Normocephalic and atraumatic.      Nose: Nose normal.      Mouth/Throat:      Mouth: Mucous membranes are moist.   Eyes:      Extraocular Movements: Extraocular movements intact.      Conjunctiva/sclera: Conjunctivae normal.      Pupils: Pupils are equal, round, and reactive to light.   Cardiovascular:      Rate and Rhythm: Normal rate and regular rhythm.      Pulses: Normal pulses.   Pulmonary:      Effort: Pulmonary effort is normal. Tachypnea present.      Breath sounds: Decreased air movement present.   Abdominal:      General: Bowel sounds are normal.      Palpations: Abdomen is soft.   Musculoskeletal:      Cervical back: Normal range of motion and neck supple.   Skin:     Comments: Areas of ecchymosis to forearms. Bruising to abdomen from diabetes monitor.    Neurological:      General: No focal deficit present.      Mental Status: She is alert and oriented to person, place, and time. Mental status is at baseline.   Psychiatric:         Mood and Affect: Mood normal.         Behavior: Behavior normal.       LABS AND IMAGING REVIEWED IN EPIC      Assessment:   Recurrent adenocarcinoma to RM---new T1 vs M1a  lef  It appears from the reports this patient has 1 local area of recurrence of her adenocarcinoma.  However ,I explained to the patient that I need to review her definitive images.  We discussed her options if she only has 1 area of recurrent disease.  Surgery, radiation.I would be reluctant to resume systemic immunotherapy given her degree of intolerance to immunotherapy with pneumonitis diabetes, and adrenal insufficiency.     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 On sulfasalazine/plaquenil    Adrenal insufficiency-On prednisone      Uncontrolled DM, ---better with insulin pump    Hypothyroidism    Risk of pneumonitis due to prior history-prescribed QVAR but $$$$. 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  Per NCCN Guidelines:  High dose 1-2mg/kg of prednisone until symptoms subside to grade 1 or less, then gradually taper over 4-6 weeks  Consider bronchoscopy with BAL (send for institutional immunocompromised panel) and consider transbronchial lung biopsy if clinically feasible to rule out progressive malignancy or fungal infections  She completed Augmentin  Continue pulse oximetry resting and ambulation  If no improvement after 48-72 hours of steroids, treat as grade 3    Plan:   With symptoms not improving, continue prednisone 60 mg daily as per NCCN Guidelines for immune related toxicity  Continue Bactrim 3 times a week   She will repeat PET and MRI with MD Cho in 7/9/24  CBC, CMP, on return  TSH in 4 weeks  PPI prophy  Refer to pulmonary clinic for bronchoscopy with BAL    Follow up in about 2 weeks (around 7/10/2024) for T/M visit to discuss symptoms.

## 2024-06-27 ENCOUNTER — PATIENT MESSAGE (OUTPATIENT)
Dept: HEMATOLOGY/ONCOLOGY | Facility: CLINIC | Age: 64
End: 2024-06-27
Payer: MEDICARE

## 2024-07-09 NOTE — PROGRESS NOTES
Subjective:       Patient ID: Rosanna Rich is a 64 y.o. female.    Chief Complaint: Follow-up (2 week virtual follow up)    Referring: Self referral  Oncology: Dr. Nguyen    Diagnosis: Recurrent adenocarcinoma of lung overlapping sites    Current Treatment: OP DURVALUMAB 1500 MG Q4W ---on hold                                          Clinical History:  This is a 64 y/o female with long standing history of recurrent adenocarcinoma of the lung. See above history and treatment.   She was last seen in this clinic as a second opinion in August of 2019. She continued her therapy in [place].   In August of 2023, she was diagnosed with her 4th recurrence in the [facility].      Oncology History Overview Note   Treatment History:  2009-initial diagnosis. T2 N1 M0. S/P resection followed by adjuvant Cisplatin  8/11/2017: Second recurrence-T3 N0 adenocarcinoma RLL lobectomy. PDL-1 5%. Negative ALK. 4 cycles of adjuvant carboplatin and pemetrexed  8/30/2019: Recurrent T4 adenocarcinoma with pleural involvement, M1 lymph node gastrohepatic and left dome of liver.   9/19/19: Carboplatin/Taxol/Keytruda followed by Keytruda maintenance, last dose  between  4/ or 7/22/22---stopped due to pneumonitis  Pneumonitis-- was on Steroids  --SOB and oxygen  1/ 2023 new nodule  7mm/9mm---  8/2023 + PET still growing  Biopsy   9/27--- Adenocarcinoma     Malignant neoplasm of left lung   9/25/2023 Pathology Significant Finding    [facility] core biopsy- Focal moderately differentiated adenocarcinoma  PDL-1 58%     9/25/2023 Cancer Staged    Staging form: Lung, AJCC 8th Edition  - Clinical stage from 9/25/2023: Stage IIB (rcT1c, cN1, cM0)     10/18/2023 Imaging Significant Findings    10/18/2023: PET-CT:   Asymmetric left-sided adenoid and lingula tonsil involving central base of tongue SUV 14   Left upper lobe pulmonary mass 1.7 cm SUV of 7.9, no hilar or mediastinal uptake no pulmonary parenchymal mass .    Left port, asymmetric capsular  breast calcification on implant on the right, chronic interstitial lung disease, no lytic or blastic lesions the left upper lobe pulmonary nodules enlarged from 1.1 cm to 1.7 cm, consistent with growing neoplasm       1/3/2024 - 2/21/2024 Radiation Therapy    Start Treatment: 01-  End Treatment: 02-  Left lung/Mediast.   200 cGyRBE 35/35 7000/7000 cGyRBE=\   Proton 3-D plan   ==========================================================      1/3/2024 - 2/14/2024 Chemotherapy    Weekly carbo/taxol with xrt     1/23/2024 Pathology Significant Finding    MD VALDEZ ANISH: Mutation Analysis Precision Panel Interpretation and Report    KRAS c.35G>A p.G12D Exon 2 16% SNV - Missense   More than 5 genes. See details  in full report      2/23/2024 Research Study Participant    Research Study:    2/23/2024 -  TX-Chemotherapy/Biotherapy/Investigational/SMI (Autogenerated)      Treatment Plan     2019-1164  Autologous Lymphocyte Infusion Phase 2 (OP)      Chemotherapy summary: [No matching medication found in this treatment plan]      Treatment Dates: 2/23/2024 (Planned) to 4/5/2024 (Planned)   Line of Treatment: Solid: Metastatic, Second Line and Subsequent      Treatment Goal: Life Prolongation                  3/18/2024 Imaging Significant Findings    IMPRESSION:  Left upper lung nodules has decreased in size and metabolic activity. No new nodules or adenopathy.  Resolution of FDG avid left suprahilar lymph node.  ACTIONABLE ITEMS/RECOMMENDATIONS*: None.     3/19/2024 -  Chemotherapy    Plan Name: OP DURVALUMAB 1500 MG Q4W     5/30/2024 Imaging Significant Findings    05/30/2024: CT scan: Atelectasis as well as air bronchograms seen in the left upper medial lung with a mass was previously there is infiltrative change beyond this.       Interval History:   07/10/2024   The patient location is: home  Visit type: audiovisual      25 minutes of total time spent on the encounter, which includes face to face time and  "non-face to face time preparing to see the patient (eg, review of tests), Obtaining and/or reviewing separately obtained history, Documenting clinical information in the electronic or other health record, Independently interpreting results (not separately reported) and communicating results to the patient/family/caregiver, or Care coordination (not separately reported).       Each patient to whom he or she provides medical services by telemedicine is:  (1) informed of the relationship between the physician and patient and the respective role of any other health care provider with respect to management of the patient; and (2) notified that he or she may decline to receive medical services by telemedicine and may withdraw from such care at any time.    Notes:  She is currently on 60mg prednisone. She has been on this dose for about 6 weeks. Her breathing and oxygen levels are about the same. She has not noticed a difference. She deneis fever chills. She has appt with pulmonology on 7/15/24. Then follow up at [facility] for imaging.     She was started on prednisone 80mg per day on 5/20/24 with minimal improvement. She was tapered to 60mg and is currently at 60mg. She cannot go more than a few minutes without her oxygen.  Her O2 sats run in the low 80s on exertion. Her lungs "are stiff and hurt" in the am.   Her SOB is worse on exertion. She denies productive cough and fever. She does not feel bad, "just can't breathe"  Her DM is managed with increasing insulin dosing.      Past Medical History:   Diagnosis Date    Anemia     Depression     Lung cancer       Past Surgical History:   Procedure Laterality Date    AUGMENTATION OF BREAST      COLONOSCOPY      HYSTERECTOMY      LOBECTOMY Right     R middle and R lower lobe    LUNG BIOPSY Left     PORTACATH PLACEMENT      TUBAL LIGATION       Social History     Socioeconomic History    Marital status:    Tobacco Use    Smoking status: Former     Types: Cigarettes    " Smokeless tobacco: Never   Substance and Sexual Activity    Alcohol use: Yes     Comment: Social      Family History   Adopted: Yes   Problem Relation Name Age of Onset    Cancer Father          Lung      Review of patient's allergies indicates:  No Known Allergies   Review of Systems   Constitutional:  Negative for appetite change and unexpected weight change.   HENT:  Negative for mouth sores.    Eyes:  Negative for visual disturbance.   Respiratory:  Positive for shortness of breath. Negative for cough.    Cardiovascular:  Negative for chest pain.   Gastrointestinal:  Negative for abdominal pain and diarrhea.   Genitourinary:  Negative for frequency.   Musculoskeletal:  Negative for back pain.   Skin:  Negative for rash.   Neurological:  Negative for headaches.   Hematological:  Negative for adenopathy.   Psychiatric/Behavioral:  The patient is not nervous/anxious.        A complete 12 point ROS was performed in full with pertinent positives as described in interval history. Remainder of ROS done in full and are negative.        Objective:      There were no vitals filed for this visit.       Physical Exam  Constitutional:       Appearance: Normal appearance.   HENT:      Head: Normocephalic.   Neurological:      General: No focal deficit present.      Mental Status: She is alert and oriented to person, place, and time.   Psychiatric:         Mood and Affect: Mood normal.       LABS AND IMAGING REVIEWED IN EPIC      Assessment:   Recurrent adenocarcinoma to RM---new T1 vs M1a  lef  It appears from the reports this patient has 1 local area of recurrence of her adenocarcinoma.  However ,I explained to the patient that I need to review her definitive images.  We discussed her options if she only has 1 area of recurrent disease.  Surgery, radiation.I would be reluctant to resume systemic immunotherapy given her degree of intolerance to immunotherapy with pneumonitis diabetes, and adrenal insufficiency.      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 On sulfasalazine/plaquenil    Adrenal insufficiency-On prednisone      Uncontrolled DM, ---better with insulin pump    Hypothyroidism    Risk of pneumonitis due to prior history-prescribed QVAR but $$$$. 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 With symptoms not improving, decrease prednisone 40 mg daily as per NCCN Guidelines for immune related toxicity  Continue Bactrim 3 times a week   She will repeat PET and MRI with MD Cho on 7/19/24  CBC, CMP, on return  TSH in 4 weeks  [facility] for bronchoscopy with BAL on 7/15/24    Follow up in about 2 weeks (around 7/24/2024) for OV with Dr. Chisholm, no labs, She will have labs at [facility].

## 2024-07-10 ENCOUNTER — OFFICE VISIT (OUTPATIENT)
Dept: HEMATOLOGY/ONCOLOGY | Facility: CLINIC | Age: 64
End: 2024-07-10
Payer: MEDICARE

## 2024-07-10 DIAGNOSIS — E11.9 DIABETES MELLITUS TREATED WITH INSULIN: ICD-10-CM

## 2024-07-10 DIAGNOSIS — R91.8 PULMONARY INFILTRATE ON CHEST X-RAY: ICD-10-CM

## 2024-07-10 DIAGNOSIS — J42 CHRONIC BRONCHITIS, UNSPECIFIED CHRONIC BRONCHITIS TYPE: ICD-10-CM

## 2024-07-10 DIAGNOSIS — E03.2 HYPOTHYROIDISM DUE TO MEDICATION: ICD-10-CM

## 2024-07-10 DIAGNOSIS — C34.92 MALIGNANT NEOPLASM OF LEFT LUNG, UNSPECIFIED PART OF LUNG: Primary | ICD-10-CM

## 2024-07-10 DIAGNOSIS — Z79.4 DIABETES MELLITUS TREATED WITH INSULIN: ICD-10-CM

## 2024-07-10 DIAGNOSIS — J98.4 PNEUMONITIS: ICD-10-CM

## 2024-07-10 PROCEDURE — 99213 OFFICE O/P EST LOW 20 MIN: CPT | Mod: 95,,, | Performed by: NURSE PRACTITIONER

## 2024-07-10 RX ORDER — OMEPRAZOLE 40 MG/1
40 CAPSULE, DELAYED RELEASE ORAL DAILY
COMMUNITY

## 2024-07-10 RX ORDER — BUDESONIDE 180 UG/1
2 AEROSOL, POWDER RESPIRATORY (INHALATION) EVERY 12 HOURS
Qty: 1 EACH | Refills: 1 | Status: SHIPPED | OUTPATIENT
Start: 2024-07-10

## 2024-07-25 ENCOUNTER — PATIENT MESSAGE (OUTPATIENT)
Dept: HEMATOLOGY/ONCOLOGY | Facility: CLINIC | Age: 64
End: 2024-07-25
Payer: MEDICARE

## 2024-07-30 DIAGNOSIS — R91.8 PULMONARY INFILTRATE ON CHEST X-RAY: ICD-10-CM

## 2024-07-30 RX ORDER — SULFAMETHOXAZOLE AND TRIMETHOPRIM 800; 160 MG/1; MG/1
1 TABLET ORAL
Qty: 12 TABLET | Refills: 0 | Status: SHIPPED | OUTPATIENT
Start: 2024-07-31

## 2024-08-07 PROBLEM — J98.4 PNEUMONITIS: Status: ACTIVE | Noted: 2024-08-07

## 2024-08-07 PROBLEM — D84.821 IMMUNODEFICIENCY DUE TO DRUGS: Status: ACTIVE | Noted: 2024-08-07

## 2024-08-07 PROBLEM — E03.2 HYPOTHYROIDISM DUE TO MEDICATION: Status: ACTIVE | Noted: 2024-08-07

## 2024-08-07 PROBLEM — Z79.899 IMMUNODEFICIENCY DUE TO DRUGS: Status: ACTIVE | Noted: 2024-08-07

## 2024-08-08 ENCOUNTER — OFFICE VISIT (OUTPATIENT)
Dept: HEMATOLOGY/ONCOLOGY | Facility: CLINIC | Age: 64
End: 2024-08-08
Payer: MEDICARE

## 2024-08-08 VITALS
HEIGHT: 65 IN | WEIGHT: 254.19 LBS | SYSTOLIC BLOOD PRESSURE: 119 MMHG | DIASTOLIC BLOOD PRESSURE: 82 MMHG | BODY MASS INDEX: 42.35 KG/M2 | HEART RATE: 93 BPM | TEMPERATURE: 98 F | OXYGEN SATURATION: 95 %

## 2024-08-08 DIAGNOSIS — Z79.899 IMMUNODEFICIENCY DUE TO DRUGS: ICD-10-CM

## 2024-08-08 DIAGNOSIS — R91.8 PULMONARY INFILTRATE ON CHEST X-RAY: ICD-10-CM

## 2024-08-08 DIAGNOSIS — C34.92 MALIGNANT NEOPLASM OF LEFT LUNG, UNSPECIFIED PART OF LUNG: Primary | ICD-10-CM

## 2024-08-08 DIAGNOSIS — D84.821 IMMUNODEFICIENCY DUE TO DRUGS: ICD-10-CM

## 2024-08-08 DIAGNOSIS — E03.2 HYPOTHYROIDISM DUE TO MEDICATION: ICD-10-CM

## 2024-08-08 DIAGNOSIS — J98.4 PNEUMONITIS: ICD-10-CM

## 2024-08-08 PROCEDURE — 99214 OFFICE O/P EST MOD 30 MIN: CPT | Mod: S$PBB,,, | Performed by: INTERNAL MEDICINE

## 2024-08-08 PROCEDURE — 99214 OFFICE O/P EST MOD 30 MIN: CPT | Mod: PBBFAC | Performed by: INTERNAL MEDICINE

## 2024-08-08 PROCEDURE — 99999 PR PBB SHADOW E&M-EST. PATIENT-LVL IV: CPT | Mod: PBBFAC,,, | Performed by: INTERNAL MEDICINE

## 2024-08-21 ENCOUNTER — PATIENT MESSAGE (OUTPATIENT)
Dept: HEMATOLOGY/ONCOLOGY | Facility: CLINIC | Age: 64
End: 2024-08-21
Payer: MEDICARE

## 2024-09-03 NOTE — PROGRESS NOTES
Subjective:       Patient ID: Rosanna Rich is a 64 y.o. female.    Chief Complaint: No chief complaint on file.    Referring: Self referral  Oncology: Dr. Nguyen    Diagnosis: Recurrent adenocarcinoma of lung overlapping sites    Current Treatment: OP DURVALUMAB 1500 MG Q4W ---on hold                                          Clinical History:  This is a 62 y/o female with long standing history of recurrent adenocarcinoma of the lung. See above history and treatment.   She was last seen in this clinic as a second opinion in August of 2019. She continued her therapy in [place].   In August of 2023, she was diagnosed with her 4th recurrence in the [facility].      Oncology History Overview Note   Treatment History:  2009-initial diagnosis. T2 N1 M0. S/P resection followed by adjuvant Cisplatin  8/11/2017: Second recurrence-T3 N0 adenocarcinoma RLL lobectomy. PDL-1 5%. Negative ALK. 4 cycles of adjuvant carboplatin and pemetrexed  8/30/2019: Recurrent T4 adenocarcinoma with pleural involvement, M1 lymph node gastrohepatic and left dome of liver.   9/19/19: Carboplatin/Taxol/Keytruda followed by Keytruda maintenance, last dose  between  4/ or 7/22/22---stopped due to pneumonitis  Pneumonitis-- was on Steroids  --SOB and oxygen  1/ 2023 new nodule  7mm/9mm---  8/2023 + PET still growing  Biopsy   9/27--- Adenocarcinoma     Malignant neoplasm of left lung   9/25/2023 Pathology Significant Finding    RM core biopsy- Focal moderately differentiated adenocarcinoma  PDL-1 58%     9/25/2023 Cancer Staged    Staging form: Lung, AJCC 8th Edition  - Clinical stage from 9/25/2023: Stage IIB (rcT1c, cN1, cM0)     10/18/2023 Imaging Significant Findings    10/18/2023: PET-CT:   Asymmetric left-sided adenoid and lingula tonsil involving central base of tongue SUV 14   Left upper lobe pulmonary mass 1.7 cm SUV of 7.9, no hilar or mediastinal uptake no pulmonary parenchymal mass .    Left port, asymmetric capsular breast  calcification on implant on the right, chronic interstitial lung disease, no lytic or blastic lesions the left upper lobe pulmonary nodules enlarged from 1.1 cm to 1.7 cm, consistent with growing neoplasm       1/3/2024 - 2/21/2024 Radiation Therapy    Start Treatment: 01-  End Treatment: 02-  Left lung/Mediast.   200 cGyRBE 35/35 7000/7000 cGyRBE=\   Proton 3-D plan   ==========================================================      1/3/2024 - 2/14/2024 Chemotherapy    Weekly carbo/taxol with xrt     1/23/2024 Pathology Significant Finding    MD JOSÉ MIGUEL OCONNELL: Mutation Analysis Precision Panel Interpretation and Report    KRAS c.35G>A p.G12D Exon 2 16% SNV - [employer]   More than 5 genes. See details  in full report      2/23/2024 Research Study Participant    Research Study:    2/23/2024 -  TX-Chemotherapy/Biotherapy/Investigational/SMI (Autogenerated)      Treatment Plan     2019-1164  Autologous Lymphocyte Infusion Phase 2 (OP)      Chemotherapy summary: [No matching medication found in this treatment plan]      Treatment Dates: 2/23/2024 (Planned) to 4/5/2024 (Planned)   Line of Treatment: Solid: Metastatic, Second Line and Subsequent      Treatment Goal: Life Prolongation                  3/18/2024 Imaging Significant Findings    IMPRESSION:  Left upper lung nodules has decreased in size and metabolic activity. No new nodules or adenopathy.  Resolution of FDG avid left suprahilar lymph node.  ACTIONABLE ITEMS/RECOMMENDATIONS*: None.     3/19/2024 -  Chemotherapy    Plan Name: OP DURVALUMAB 1500 MG Q4W     5/30/2024 Imaging Significant Findings    05/30/2024: CT scan: Atelectasis as well as air bronchograms seen in the left upper medial lung with a mass was previously there is infiltrative change beyond this.       Interval History:   09/05/2024   Patient present for follow up for Lung CA;   No fever  She is doing well; denies falls, s/s of infection and abnormal bleeding.  Reports to currently  taking 20 mg prednisone; O2 is good while sitting however continues to drops when she walked Discussed having to use O2 for life and holding Immunotherapy for 3 months (until steroid tapered).      Advised to monitor BS and continue Bactrim RX.  Discuss MDA notes, labs and scans.  Encouraged hydration and only take Tylenol for any pain management.  Discussed future appointments; future pulmonary function test    Past Medical History:   Diagnosis Date    Anemia     Depression     Lung cancer       Past Surgical History:   Procedure Laterality Date    AUGMENTATION OF BREAST      COLONOSCOPY      HYSTERECTOMY      LOBECTOMY Right     R middle and R lower lobe    LUNG BIOPSY Left     PORTACATH PLACEMENT      TUBAL LIGATION       Social History     Socioeconomic History    Marital status:    Tobacco Use    Smoking status: Former     Types: Cigarettes     Start date:      Quit date:      Years since quittin.7    Smokeless tobacco: Never    Tobacco comments:     Patient quit smoking in . Patient smoked for about 20 years    Substance and Sexual Activity    Alcohol use: Yes     Comment: Social     Social Determinants of Health     Financial Resource Strain: Low Risk  (2024)    Overall Financial Resource Strain (CARDIA)     Difficulty of Paying Living Expenses: Not hard at all   Food Insecurity: No Food Insecurity (2024)    Hunger Vital Sign     Worried About Running Out of Food in the Last Year: Never true     Ran Out of Food in the Last Year: Never true   Physical Activity: Inactive (2024)    Exercise Vital Sign     Days of Exercise per Week: 0 days     Minutes of Exercise per Session: 0 min   Stress: No Stress Concern Present (2024)    [employer] - Occupational Stress Questionnaire     Feeling of Stress : Not at all   Housing Stability: Unknown (2024)    Housing Stability Vital Sign     Unable to Pay for Housing in the Last Year: No      Family  History   Adopted: Yes   Problem Relation Name Age of Onset    Cancer Father          Lung      Review of patient's allergies indicates:  No Known Allergies   Review of Systems   Constitutional:  Negative for appetite change and unexpected weight change.   HENT:  Negative for mouth sores.    Eyes:  Negative for visual disturbance.   Respiratory:  Positive for shortness of breath. Negative for cough.    Cardiovascular:  Negative for chest pain.   Gastrointestinal:  Negative for abdominal pain and diarrhea.   Genitourinary:  Negative for frequency.   Musculoskeletal:  Negative for back pain.   Skin:  Negative for rash.   Neurological:  Negative for headaches.   Hematological:  Negative for adenopathy.   Psychiatric/Behavioral:  The patient is not nervous/anxious.        A complete 12 point ROS was performed in full with pertinent positives as described in interval history. Remainder of ROS done in full and are negative.        Objective:      There were no vitals filed for this visit.      Physical Exam  Constitutional:       Comments: Cushinoid on NASAL canula   HENT:      Head: Atraumatic.   Pulmonary:      Effort: Tachypnea present.   Neurological:      Mental Status: She is alert and oriented to person, place, and time.   Psychiatric:         Mood and Affect: Mood normal.         Behavior: Behavior normal.         Thought Content: Thought content normal.         Judgment: Judgment normal.     LABS AND IMAGING REVIEWED IN EPIC      Assessment:   Recurrent adenocarcinoma to RM---new T1 vs M1a  lef  It appears from the reports this patient has 1 local area of recurrence of her adenocarcinoma.  However ,I explained to the patient that I need to review her definitive images.  We discussed her options if she only has 1 area of recurrent disease.  Surgery, radiation.I would be reluctant to resume systemic immunotherapy given her degree of intolerance to immunotherapy with pneumonitis diabetes, and adrenal insufficiency.      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 On sulfasalazine/plaquenil    Adrenal insufficiency-On prednisone      Uncontrolled DM, ---better with insulin pump    Hypothyroidism    Risk of pneumonitis due to prior history-prescribed QVAR but $$$$. 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 Down to 20  will move to 15 mg now and two week go to 10 ( baseline 5/7 alternate  Continue Bactrim 3 times a week   RTC 4 weeks-6 weeks  CBC, CMP, on return and TSH and port flush  PPI prophy  And repeat PFT with Pulmonary   Will not resume Immuontheray at present with toxicicty--and at resuming use lower dose    Follow up in about 4 weeks (around 10/3/2024) for with labs and port flush--, With ME.       Miguel Chisholm MD  Telemed: This visit was a telemedicine/telephone visit.  Real-time audio and video were used following [facility] protocols.  The patient and/or family agreed to the security of information at their location.  I was located in the [facility] with this visit occurred.  The patient was located ______at home in [place]__________  I am licensed to practice in the [facility]     Total time   20 min

## 2024-09-05 ENCOUNTER — OFFICE VISIT (OUTPATIENT)
Dept: HEMATOLOGY/ONCOLOGY | Facility: CLINIC | Age: 64
End: 2024-09-05
Payer: MEDICARE

## 2024-09-05 DIAGNOSIS — J98.4 PNEUMONITIS: ICD-10-CM

## 2024-09-05 DIAGNOSIS — Z95.828 PORT-A-CATH IN PLACE: ICD-10-CM

## 2024-09-05 DIAGNOSIS — E03.2 HYPOTHYROIDISM DUE TO MEDICATION: ICD-10-CM

## 2024-09-05 DIAGNOSIS — C34.92 MALIGNANT NEOPLASM OF LEFT LUNG, UNSPECIFIED PART OF LUNG: Primary | ICD-10-CM

## 2024-09-05 RX ORDER — SODIUM CHLORIDE 0.9 % (FLUSH) 0.9 %
10 SYRINGE (ML) INJECTION
OUTPATIENT
Start: 2024-10-03

## 2024-09-05 RX ORDER — HEPARIN 100 UNIT/ML
500 SYRINGE INTRAVENOUS
OUTPATIENT
Start: 2024-10-03

## 2024-09-12 ENCOUNTER — PATIENT MESSAGE (OUTPATIENT)
Dept: HEMATOLOGY/ONCOLOGY | Facility: CLINIC | Age: 64
End: 2024-09-12
Payer: MEDICARE

## 2024-09-25 DIAGNOSIS — R91.8 PULMONARY INFILTRATE ON CHEST X-RAY: ICD-10-CM

## 2024-09-25 RX ORDER — SULFAMETHOXAZOLE AND TRIMETHOPRIM 800; 160 MG/1; MG/1
1 TABLET ORAL
Qty: 12 TABLET | Refills: 0 | Status: SHIPPED | OUTPATIENT
Start: 2024-09-25

## 2024-10-01 NOTE — NURSING
I spoke to Rosanna for a while about her appointment on Monday. She is anxious and nervous and wants answers.   RX PROGRESS NOTE: Warfarin Anticoagulation Monitoring Initiation Note    Warfarin prescribed for the indication of LVAD.    Target INR is 1.5-1.8.    Anticipated duration of therapy is Indefinite.    Patient continued on prior warfarin therapy.  Dose prior to pharmacist inpatient anticoagulation management service consultation was 4 mg 4 days/week and 2 mg 3 days/week - last dose 9/27..    Most Recent Lab Values:  INR (no units)   Date/Time Value   10/01/2024 0550 1.1   09/26/2024 0000 1.5     HGB (g/dL)   Date/Time Value   10/01/2024 0550 8.6 (L)     HCT (%)   Date/Time Value   10/01/2024 0550 26.3 (L)     PLT (K/mcL)   Date/Time Value   10/01/2024 0550 181     GOT/AST (Units/L)   Date/Time Value   10/01/2024 0550 38 (H)     GPT/ALT (Units/L)   Date/Time Value   10/01/2024 0550 42     Serum creatinine: 1.43 mg/dL (H) 10/01/24 0550  Estimated creatinine clearance: 26.3 mL/min (A)  OB/Gyn status: Postmenopausal      Medical History:  Weight: Weight: 50 kg (10/01/24 0500)  Age: 72 year old  Patient does not have factors that may warrant deviation from the standard protocol (protocol exception).     Other pertinent medical history includes: possible GI bleed    The patient's medication and allergy profile was reviewed for possible drug-allergy, drug-drug, and drug-herbal interactions.    Assessment & Plan:  For anticoagulation therapy, will resume warfarin dose of 2 mg once.     Pharmacist inpatient anticoagulation management will review/monitor patient daily and order warfarin dose/regimen based on protocol.    Thank you,    Joan Deras, PHARMD  10/1/2024 2:30 PM

## 2024-10-02 ENCOUNTER — PATIENT MESSAGE (OUTPATIENT)
Dept: HEMATOLOGY/ONCOLOGY | Facility: CLINIC | Age: 64
End: 2024-10-02
Payer: MEDICARE

## 2024-10-02 DIAGNOSIS — J98.4 PNEUMONITIS: Primary | ICD-10-CM

## 2024-10-02 RX ORDER — PREDNISONE 10 MG/1
TABLET ORAL
Qty: 30 TABLET | Refills: 0 | Status: SHIPPED | OUTPATIENT
Start: 2024-10-02

## 2024-10-05 NOTE — PROGRESS NOTES
Subjective:       Patient ID: Rosanna Rich is a 64 y.o. female.    Chief Complaint: Follow-up (MPF today/PFT completed via [employer] 10/7/24; Dr. Cox nurse calling [employer] for report and will fax to our office./Can she start taking aspirin again)    Referring: Self referral  Oncology: Dr. Nguyen    Diagnosis: Recurrent adenocarcinoma of lung overlapping sites    Current Treatment: OP DURVALUMAB 1500 MG Q4W ---on hold                                          Clinical History:  This is a 64 y/o female with long standing history of recurrent adenocarcinoma of the lung. See above history and treatment.   She was last seen in this clinic as a second opinion in August of 2019. She continued her therapy in [place].   In August of 2023, she was diagnosed with her 4th recurrence in the RM.      Oncology History Overview Note   Treatment History:  2009-initial diagnosis. T2 N1 M0. S/P resection followed by adjuvant Cisplatin  8/11/2017: Second recurrence-T3 N0 adenocarcinoma RLL lobectomy. PDL-1 5%. Negative ALK. 4 cycles of adjuvant carboplatin and pemetrexed  8/30/2019: Recurrent T4 adenocarcinoma with pleural involvement, M1 lymph node gastrohepatic and left dome of liver.   9/19/19: Carboplatin/Taxol/Keytruda followed by Keytruda maintenance, last dose  between  4/ or 7/22/22---stopped due to pneumonitis  Pneumonitis-- was on Steroids  --SOB and oxygen  1/ 2023 new nodule  7mm/9mm---  8/2023 + PET still growing  Biopsy   9/27--- Adenocarcinoma     Malignant neoplasm of left lung   9/25/2023 Pathology Significant Finding    RM core biopsy- Focal moderately differentiated adenocarcinoma  PDL-1 58%     9/25/2023 Cancer Staged    Staging form: Lung, AJCC 8th Edition  - Clinical stage from 9/25/2023: Stage IIB (rcT1c, cN1, cM0)     10/18/2023 Imaging Significant Findings    10/18/2023: PET-CT:   Asymmetric left-sided adenoid and lingula tonsil involving central base of tongue SUV 14   Left upper lobe pulmonary  mass 1.7 cm SUV of 7.9, no hilar or mediastinal uptake no pulmonary parenchymal mass .    Left port, asymmetric capsular breast calcification on implant on the right, chronic interstitial lung disease, no lytic or blastic lesions the left upper lobe pulmonary nodules enlarged from 1.1 cm to 1.7 cm, consistent with growing neoplasm       1/3/2024 - 2/21/2024 Radiation Therapy    Start Treatment: 01-  End Treatment: 02-  Left lung/Mediast.   200 cGyRBE 35/35 7000/7000 cGyRBE=\   Proton 3-D plan   ==========================================================      1/3/2024 - 2/14/2024 Chemotherapy    Weekly carbo/taxol with xrt     1/23/2024 Pathology Significant Finding    MD VALDEZ ANISH: Mutation Analysis Precision Panel Interpretation and Report    KRAS c.35G>A p.G12D Exon 2 16% SNV - [employer]   More than 5 genes. See details  in full report      2/23/2024 Research Study Participant    Research Study:    2/23/2024 -  [place]-Chemotherapy/Biotherapy/Investigational/SMI (Autogenerated)      Treatment Plan     2019-1164  Autologous Lymphocyte Infusion Phase 2 (OP)      Chemotherapy summary: [No matching medication found in this treatment plan]      Treatment Dates: 2/23/2024 (Planned) to 4/5/2024 (Planned)   Line of Treatment: Solid: Metastatic, Second Line and Subsequent      Treatment Goal: Life Prolongation                  3/18/2024 Imaging Significant Findings    IMPRESSION:  Left upper lung nodules has decreased in size and metabolic activity. No new nodules or adenopathy.  Resolution of FDG avid left suprahilar lymph node.  ACTIONABLE ITEMS/RECOMMENDATIONS*: None.     3/19/2024 -  Chemotherapy    Plan Name: OP DURVALUMAB 1500 MG Q4W     5/30/2024 Imaging Significant Findings    05/30/2024: CT scan: Atelectasis as well as air bronchograms seen in the left upper medial lung with a mass was previously there is infiltrative change beyond this.       Interval History:   10/11/2024   Patient present for follow  up for Lung CA;   No fever  She is doing well; denies falls, s/s of infection and abnormal bleeding.  Reports to currently taking 10 mg prednisone; O2 is good while sitting however continues to drops when she walked Discussed having to use O2 for life and holding Immunotherapy for 3 months (until steroid tapered).      Advised to monitor BS and continue Bactrim RX.  Discuss MDA notes, labs and scans.  Encouraged hydration and only take Tylenol for any pain management.  Discussed future appointments; future pulmonary function test    Past Medical History:   Diagnosis Date    Anemia     Depression     Lung cancer       Past Surgical History:   Procedure Laterality Date    AUGMENTATION OF BREAST      COLONOSCOPY      HYSTERECTOMY      LOBECTOMY Right     R middle and R lower lobe    LUNG BIOPSY Left     PORTACATH PLACEMENT      TUBAL LIGATION       Social History     Socioeconomic History    Marital status:    Tobacco Use    Smoking status: Former     Types: Cigarettes     Start date:      Quit date:      Years since quittin.8    Smokeless tobacco: Never    Tobacco comments:     Patient quit smoking in . Patient smoked for about 20 years    Substance and Sexual Activity    Alcohol use: Yes     Comment: Social     Social Drivers of Health     Financial Resource Strain: Low Risk  (2024)    Overall Financial Resource Strain (CARDIA)     Difficulty of Paying Living Expenses: Not hard at all   Food Insecurity: No Food Insecurity (2024)    Hunger Vital Sign     Worried About Running Out of Food in the Last Year: Never true     Ran Out of Food in the Last Year: Never true   Physical Activity: Inactive (2024)    Exercise Vital Sign     Days of Exercise per Week: 0 days     Minutes of Exercise per Session: 0 min   Stress: No Stress Concern Present (2024)    [employer] - Occupational Stress Questionnaire     Feeling of Stress : Not at all   Housing  Stability: Unknown (7/12/2024)    Housing Stability Vital Sign     Unable to Pay for Housing in the Last Year: No      Family History   Adopted: Yes   Problem Relation Name Age of Onset    Cancer Father          Lung      Review of patient's allergies indicates:  No Known Allergies   Review of Systems   Constitutional:  Negative for appetite change and unexpected weight change.   HENT:  Negative for mouth sores.    Eyes:  Negative for visual disturbance.   Respiratory:  Positive for shortness of breath. Negative for cough.    Cardiovascular:  Negative for chest pain.   Gastrointestinal:  Negative for abdominal pain and diarrhea.   Genitourinary:  Negative for frequency.   Musculoskeletal:  Positive for back pain.   Skin:  Negative for rash.   Neurological:  Negative for headaches.   Hematological:  Negative for adenopathy.   Psychiatric/Behavioral:  The patient is not nervous/anxious.        A complete 12 point ROS was performed in full with pertinent positives as described in interval history. Remainder of ROS done in full and are negative.        Objective:      Vitals:    10/11/24 1013   BP: 128/63   Pulse: 85   Temp: 98 °F (36.7 °C)         Physical Exam  Constitutional:       Appearance: She is obese.      Comments: Cushinoid on NASAL canula   HENT:      Head: Atraumatic.   Cardiovascular:      Rate and Rhythm: Normal rate.      Pulses: Normal pulses.   Pulmonary:      Effort: Tachypnea and accessory muscle usage present.      Breath sounds: Decreased air movement present.      Comments: Chronic oxygen wearing.    Able to speak full sentences, mild nasal flaring, no accessory muscle use prolonged expiratory phase,  decreased breath sounds in the bases,  Abdominal:      General: Abdomen is flat.      Palpations: Abdomen is soft.   Musculoskeletal:      Cervical back: Neck supple.      Right lower leg: No edema.      Left lower leg: No edema.   Skin:     Capillary Refill: Capillary refill takes 2 to 3 seconds.       Findings: Bruising present.   Neurological:      Mental Status: She is alert and oriented to person, place, and time.      Cranial Nerves: No cranial nerve deficit.      Motor: No weakness.      Gait: Gait normal.   Psychiatric:         Mood and Affect: Mood normal.         Behavior: Behavior normal.         Thought Content: Thought content normal.         Judgment: Judgment normal.     LABS AND IMAGING REVIEWED IN EPIC      Assessment:   Recurrent adenocarcinoma to RM---new T1 vs M1a  lef  It appears from the reports this patient has 1 local area of recurrence of her adenocarcinoma.  However ,I explained to the patient that I need to review her definitive images.  We discussed her options if she only has 1 area of recurrent disease.  Surgery, radiation.I would be reluctant to resume systemic immunotherapy given her degree of intolerance to immunotherapy with pneumonitis diabetes, and adrenal insufficiency.     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 On sulfasalazine/plaquenil    Adrenal insufficiency-On prednisone      Uncontrolled DM, ---better with insulin pump    Hypothyroidism    Risk of pneumonitis due to prior history-prescribed QVAR but $$$$. 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 Down to 20 15 mg now  10 ( baseline 5/7 alternate   She will go to 10/5 and then will defer to Rheumatology and Pulmonary    Continue Bactrim 3 times a week with steroids and immunosuppressive risk  RTC  8 weeks  PPI prophy  Flush port today    Okay for aspirin 2 to 3 times a week  Will not resume Immuontheray at present with toxicicty--  Explained risk of autoimmune pneumonitis, risk of ventilatory and respiratory failure.    Explained there is no evidence of active cancer on her most recent scan.    If this patient was to develop progressive cancer, I would recommend she be evaluated for another clinical trial, or alternative.  Before the use of immunotherapy.    If she has no other options discussions again would be had regarding lower dose and severe risk.        Follow up in about 8 weeks (around 12/6/2024) for With port flush and records from [facility], [facility], With ME.       Miguel Chisholm MD

## 2024-10-11 ENCOUNTER — LAB VISIT (OUTPATIENT)
Dept: LAB | Facility: HOSPITAL | Age: 64
End: 2024-10-11
Attending: INTERNAL MEDICINE
Payer: MEDICARE

## 2024-10-11 ENCOUNTER — INFUSION (OUTPATIENT)
Dept: INFUSION THERAPY | Facility: HOSPITAL | Age: 64
End: 2024-10-11
Attending: INTERNAL MEDICINE
Payer: MEDICARE

## 2024-10-11 ENCOUNTER — OFFICE VISIT (OUTPATIENT)
Dept: HEMATOLOGY/ONCOLOGY | Facility: CLINIC | Age: 64
End: 2024-10-11
Payer: MEDICARE

## 2024-10-11 VITALS
TEMPERATURE: 98 F | DIASTOLIC BLOOD PRESSURE: 63 MMHG | SYSTOLIC BLOOD PRESSURE: 128 MMHG | OXYGEN SATURATION: 96 % | HEIGHT: 65 IN | WEIGHT: 253.19 LBS | BODY MASS INDEX: 42.18 KG/M2 | HEART RATE: 85 BPM

## 2024-10-11 DIAGNOSIS — Z95.828 PORT-A-CATH IN PLACE: Primary | ICD-10-CM

## 2024-10-11 DIAGNOSIS — Z95.828 PORT-A-CATH IN PLACE: ICD-10-CM

## 2024-10-11 DIAGNOSIS — D84.821 IMMUNODEFICIENCY DUE TO DRUGS: ICD-10-CM

## 2024-10-11 DIAGNOSIS — E03.2 HYPOTHYROIDISM DUE TO MEDICATION: ICD-10-CM

## 2024-10-11 DIAGNOSIS — C34.92 MALIGNANT NEOPLASM OF LEFT LUNG, UNSPECIFIED PART OF LUNG: Primary | ICD-10-CM

## 2024-10-11 DIAGNOSIS — J98.4 PNEUMONITIS: ICD-10-CM

## 2024-10-11 DIAGNOSIS — Z79.899 IMMUNODEFICIENCY DUE TO DRUGS: ICD-10-CM

## 2024-10-11 DIAGNOSIS — C34.92 MALIGNANT NEOPLASM OF LEFT LUNG, UNSPECIFIED PART OF LUNG: ICD-10-CM

## 2024-10-11 LAB
ALBUMIN SERPL-MCNC: 4 G/DL (ref 3.4–4.8)
ALBUMIN/GLOB SERPL: 1.6 RATIO (ref 1.1–2)
ALP SERPL-CCNC: 64 UNIT/L (ref 40–150)
ALT SERPL-CCNC: 19 UNIT/L (ref 0–55)
ANION GAP SERPL CALC-SCNC: 13 MEQ/L
AST SERPL-CCNC: 16 UNIT/L (ref 5–34)
BASOPHILS # BLD AUTO: 0.06 X10(3)/MCL
BASOPHILS NFR BLD AUTO: 0.5 %
BILIRUB SERPL-MCNC: 0.4 MG/DL
BUN SERPL-MCNC: 19.6 MG/DL (ref 9.8–20.1)
CALCIUM SERPL-MCNC: 9.3 MG/DL (ref 8.4–10.2)
CHLORIDE SERPL-SCNC: 102 MMOL/L (ref 98–107)
CO2 SERPL-SCNC: 27 MMOL/L (ref 23–31)
CREAT SERPL-MCNC: 1.3 MG/DL (ref 0.55–1.02)
CREAT/UREA NIT SERPL: 15
EOSINOPHIL # BLD AUTO: 0.12 X10(3)/MCL (ref 0–0.9)
EOSINOPHIL NFR BLD AUTO: 1 %
ERYTHROCYTE [DISTWIDTH] IN BLOOD BY AUTOMATED COUNT: 11.9 % (ref 11.5–17)
GFR SERPLBLD CREATININE-BSD FMLA CKD-EPI: 47 ML/MIN/1.73/M2
GLOBULIN SER-MCNC: 2.5 GM/DL (ref 2.4–3.5)
GLUCOSE SERPL-MCNC: 292 MG/DL (ref 82–115)
HCT VFR BLD AUTO: 43.8 % (ref 37–47)
HGB BLD-MCNC: 13.9 G/DL (ref 12–16)
IMM GRANULOCYTES # BLD AUTO: 0.18 X10(3)/MCL (ref 0–0.04)
IMM GRANULOCYTES NFR BLD AUTO: 1.5 %
LYMPHOCYTES # BLD AUTO: 0.96 X10(3)/MCL (ref 0.6–4.6)
LYMPHOCYTES NFR BLD AUTO: 8.1 %
MCH RBC QN AUTO: 32.9 PG (ref 27–31)
MCHC RBC AUTO-ENTMCNC: 31.7 G/DL (ref 33–36)
MCV RBC AUTO: 103.8 FL (ref 80–94)
MONOCYTES # BLD AUTO: 1.22 X10(3)/MCL (ref 0.1–1.3)
MONOCYTES NFR BLD AUTO: 10.3 %
NEUTROPHILS # BLD AUTO: 9.34 X10(3)/MCL (ref 2.1–9.2)
NEUTROPHILS NFR BLD AUTO: 78.6 %
PLATELET # BLD AUTO: 239 X10(3)/MCL (ref 130–400)
PMV BLD AUTO: 9.6 FL (ref 7.4–10.4)
POTASSIUM SERPL-SCNC: 3.6 MMOL/L (ref 3.5–5.1)
PROT SERPL-MCNC: 6.5 GM/DL (ref 5.8–7.6)
RBC # BLD AUTO: 4.22 X10(6)/MCL (ref 4.2–5.4)
SODIUM SERPL-SCNC: 142 MMOL/L (ref 136–145)
TSH SERPL-ACNC: 2.11 UIU/ML (ref 0.35–4.94)
WBC # BLD AUTO: 11.88 X10(3)/MCL (ref 4.5–11.5)

## 2024-10-11 PROCEDURE — 63600175 PHARM REV CODE 636 W HCPCS: Performed by: INTERNAL MEDICINE

## 2024-10-11 PROCEDURE — 96523 IRRIG DRUG DELIVERY DEVICE: CPT

## 2024-10-11 PROCEDURE — 85025 COMPLETE CBC W/AUTO DIFF WBC: CPT

## 2024-10-11 PROCEDURE — 36415 COLL VENOUS BLD VENIPUNCTURE: CPT

## 2024-10-11 PROCEDURE — A4216 STERILE WATER/SALINE, 10 ML: HCPCS | Performed by: INTERNAL MEDICINE

## 2024-10-11 PROCEDURE — 25000003 PHARM REV CODE 250: Performed by: INTERNAL MEDICINE

## 2024-10-11 PROCEDURE — 84443 ASSAY THYROID STIM HORMONE: CPT

## 2024-10-11 PROCEDURE — 80053 COMPREHEN METABOLIC PANEL: CPT

## 2024-10-11 PROCEDURE — 99999 PR PBB SHADOW E&M-EST. PATIENT-LVL V: CPT | Mod: PBBFAC,,, | Performed by: INTERNAL MEDICINE

## 2024-10-11 PROCEDURE — 99215 OFFICE O/P EST HI 40 MIN: CPT | Mod: PBBFAC,25 | Performed by: INTERNAL MEDICINE

## 2024-10-11 RX ORDER — SODIUM CHLORIDE 0.9 % (FLUSH) 0.9 %
10 SYRINGE (ML) INJECTION
OUTPATIENT
Start: 2024-12-06

## 2024-10-11 RX ORDER — MELATONIN 10 MG
CAPSULE ORAL
COMMUNITY

## 2024-10-11 RX ORDER — HEPARIN 100 UNIT/ML
500 SYRINGE INTRAVENOUS
OUTPATIENT
Start: 2024-12-06

## 2024-10-11 RX ORDER — SODIUM CHLORIDE 0.9 % (FLUSH) 0.9 %
10 SYRINGE (ML) INJECTION
Status: DISCONTINUED | OUTPATIENT
Start: 2024-10-11 | End: 2024-10-11 | Stop reason: HOSPADM

## 2024-10-11 RX ORDER — HEPARIN 100 UNIT/ML
500 SYRINGE INTRAVENOUS
Status: DISCONTINUED | OUTPATIENT
Start: 2024-10-11 | End: 2024-10-11 | Stop reason: HOSPADM

## 2024-10-11 RX ORDER — FUROSEMIDE 20 MG/1
1 TABLET ORAL DAILY PRN
COMMUNITY

## 2024-10-11 RX ORDER — AA/PROT/LYSINE/METHIO/VIT C/B6 50-12.5 MG
200 TABLET ORAL DAILY
COMMUNITY

## 2024-10-11 RX ADMIN — HEPARIN 500 UNITS: 100 SYRINGE at 11:10

## 2024-10-11 RX ADMIN — SODIUM CHLORIDE, PRESERVATIVE FREE 10 ML: 5 INJECTION INTRAVENOUS at 11:10

## 2024-10-21 DIAGNOSIS — R91.8 PULMONARY INFILTRATE ON CHEST X-RAY: ICD-10-CM

## 2024-10-21 RX ORDER — SULFAMETHOXAZOLE AND TRIMETHOPRIM 800; 160 MG/1; MG/1
1 TABLET ORAL
Qty: 12 TABLET | Refills: 2 | Status: SHIPPED | OUTPATIENT
Start: 2024-10-21

## 2024-10-22 ENCOUNTER — PATIENT MESSAGE (OUTPATIENT)
Dept: HEMATOLOGY/ONCOLOGY | Facility: CLINIC | Age: 64
End: 2024-10-22
Payer: MEDICARE

## 2024-10-22 DIAGNOSIS — C34.92 MALIGNANT NEOPLASM OF LEFT LUNG, UNSPECIFIED PART OF LUNG: Primary | ICD-10-CM

## 2024-10-22 RX ORDER — CEFDINIR 300 MG/1
300 CAPSULE ORAL 2 TIMES DAILY
Qty: 14 CAPSULE | Refills: 0 | Status: SHIPPED | OUTPATIENT
Start: 2024-10-22 | End: 2024-10-29

## 2024-12-06 ENCOUNTER — PATIENT MESSAGE (OUTPATIENT)
Dept: HEMATOLOGY/ONCOLOGY | Facility: CLINIC | Age: 64
End: 2024-12-06

## 2024-12-06 ENCOUNTER — INFUSION (OUTPATIENT)
Dept: INFUSION THERAPY | Facility: HOSPITAL | Age: 64
End: 2024-12-06
Attending: INTERNAL MEDICINE
Payer: MEDICARE

## 2024-12-06 ENCOUNTER — OFFICE VISIT (OUTPATIENT)
Dept: HEMATOLOGY/ONCOLOGY | Facility: CLINIC | Age: 64
End: 2024-12-06
Payer: MEDICARE

## 2024-12-06 VITALS
HEIGHT: 65 IN | BODY MASS INDEX: 41.38 KG/M2 | HEART RATE: 82 BPM | SYSTOLIC BLOOD PRESSURE: 114 MMHG | TEMPERATURE: 98 F | OXYGEN SATURATION: 95 % | WEIGHT: 248.38 LBS | DIASTOLIC BLOOD PRESSURE: 81 MMHG | RESPIRATION RATE: 20 BRPM

## 2024-12-06 VITALS
OXYGEN SATURATION: 95 % | HEART RATE: 82 BPM | WEIGHT: 248.38 LBS | BODY MASS INDEX: 41.38 KG/M2 | HEIGHT: 65 IN | DIASTOLIC BLOOD PRESSURE: 81 MMHG | TEMPERATURE: 98 F | SYSTOLIC BLOOD PRESSURE: 114 MMHG

## 2024-12-06 DIAGNOSIS — Z95.828 PORT-A-CATH IN PLACE: Primary | ICD-10-CM

## 2024-12-06 DIAGNOSIS — E03.2 HYPOTHYROIDISM DUE TO MEDICATION: ICD-10-CM

## 2024-12-06 DIAGNOSIS — R91.8 PULMONARY INFILTRATE ON CHEST X-RAY: ICD-10-CM

## 2024-12-06 DIAGNOSIS — Z95.828 PORT-A-CATH IN PLACE: ICD-10-CM

## 2024-12-06 DIAGNOSIS — C34.92 MALIGNANT NEOPLASM OF LEFT LUNG, UNSPECIFIED PART OF LUNG: Primary | ICD-10-CM

## 2024-12-06 DIAGNOSIS — J98.4 PNEUMONITIS: ICD-10-CM

## 2024-12-06 PROCEDURE — A4216 STERILE WATER/SALINE, 10 ML: HCPCS | Performed by: INTERNAL MEDICINE

## 2024-12-06 PROCEDURE — 63600175 PHARM REV CODE 636 W HCPCS: Performed by: INTERNAL MEDICINE

## 2024-12-06 PROCEDURE — 96523 IRRIG DRUG DELIVERY DEVICE: CPT

## 2024-12-06 PROCEDURE — 25000003 PHARM REV CODE 250: Performed by: INTERNAL MEDICINE

## 2024-12-06 PROCEDURE — 99999 PR PBB SHADOW E&M-EST. PATIENT-LVL V: CPT | Mod: PBBFAC,,, | Performed by: INTERNAL MEDICINE

## 2024-12-06 PROCEDURE — 99215 OFFICE O/P EST HI 40 MIN: CPT | Mod: PBBFAC | Performed by: INTERNAL MEDICINE

## 2024-12-06 RX ORDER — HEPARIN 100 UNIT/ML
500 SYRINGE INTRAVENOUS
OUTPATIENT
Start: 2025-01-31

## 2024-12-06 RX ORDER — SODIUM CHLORIDE 0.9 % (FLUSH) 0.9 %
10 SYRINGE (ML) INJECTION
Status: DISCONTINUED | OUTPATIENT
Start: 2024-12-06 | End: 2024-12-06 | Stop reason: HOSPADM

## 2024-12-06 RX ORDER — HEPARIN 100 UNIT/ML
500 SYRINGE INTRAVENOUS
Status: DISCONTINUED | OUTPATIENT
Start: 2024-12-06 | End: 2024-12-06 | Stop reason: HOSPADM

## 2024-12-06 RX ORDER — SODIUM CHLORIDE 0.9 % (FLUSH) 0.9 %
10 SYRINGE (ML) INJECTION
OUTPATIENT
Start: 2025-01-31

## 2024-12-06 RX ADMIN — Medication 10 ML: at 11:12

## 2024-12-06 RX ADMIN — HEPARIN 500 UNITS: 100 SYRINGE at 11:12

## 2024-12-06 NOTE — PROGRESS NOTES
Subjective:       Patient ID: Rosanna Rich is a 64 y.o. female.    Chief Complaint: Follow-up (MPF due today/[facility] Pulmonologist appt/Occasional croup cough)    Referring: Self referral  Oncology: Dr. Nguyne    Diagnosis: Recurrent adenocarcinoma of lung overlapping sites    Current Treatment: [No matching plan found] ---on hold                                          Clinical History:  This is a 62 y/o female with long standing history of recurrent adenocarcinoma of the lung. See above history and treatment.   She was last seen in this clinic as a second opinion in August of 2019. She continued her therapy in [place].   In August of 2023, she was diagnosed with her 4th recurrence in the RM.      Oncology History Overview Note   Treatment History:  2009-initial diagnosis. T2 N1 M0. S/P resection followed by adjuvant Cisplatin  8/11/2017: Second recurrence-T3 N0 adenocarcinoma RLL lobectomy. PDL-1 5%. Negative ALK. 4 cycles of adjuvant carboplatin and pemetrexed  8/30/2019: Recurrent T4 adenocarcinoma with pleural involvement, M1 lymph node gastrohepatic and left dome of liver.   9/19/19: Carboplatin/Taxol/Keytruda followed by Keytruda maintenance, last dose  between  4/ or 7/22/22---stopped due to pneumonitis  Pneumonitis-- was on Steroids  --SOB and oxygen  1/ 2023 new nodule  7mm/9mm---  8/2023 + PET still growing  Biopsy   9/27--- Adenocarcinoma     Malignant neoplasm of left lung   9/25/2023 Pathology Significant Finding    RM core biopsy- Focal moderately differentiated adenocarcinoma  PDL-1 58%     9/25/2023 Cancer Staged    Staging form: Lung, AJCC 8th Edition  - Clinical stage from 9/25/2023: Stage IIB (rcT1c, cN1, cM0)     10/18/2023 Imaging Significant Findings    10/18/2023: PET-CT:   Asymmetric left-sided adenoid and lingula tonsil involving central base of tongue SUV 14   Left upper lobe pulmonary mass 1.7 cm SUV of 7.9, no hilar or mediastinal uptake no pulmonary parenchymal mass .     Left port, asymmetric capsular breast calcification on implant on the right, chronic interstitial lung disease, no lytic or blastic lesions the left upper lobe pulmonary nodules enlarged from 1.1 cm to 1.7 cm, consistent with growing neoplasm       1/3/2024 - 2/21/2024 Radiation Therapy    Start Treatment: 01-  End Treatment: 02-  Left lung/Mediast.   200 cGyRBE 35/35 7000/7000 cGyRBE=\   Proton 3-D plan   ==========================================================      1/3/2024 - 2/14/2024 Chemotherapy    Weekly carbo/taxol with xrt     1/23/2024 Pathology Significant Finding    MD JOSÉ MIGUEL OCONNELL: Mutation Analysis Precision Panel Interpretation and Report    KRAS c.35G>A p.G12D Exon 2 16% SNV - Missense   More than 5 genes. See details  in full report      2/23/2024 Research Study Participant    Research Study:    2/23/2024 -  TX-Chemotherapy/Biotherapy/Investigational/SMI (Autogenerated)      Treatment Plan     2019-1164  Autologous Lymphocyte Infusion Phase 2 (OP)      Chemotherapy summary: [No matching medication found in this treatment plan]      Treatment Dates: 2/23/2024 (Planned) to 4/5/2024 (Planned)   Line of Treatment: Solid: Metastatic, Second Line and Subsequent      Treatment Goal: Life Prolongation                  3/18/2024 Imaging Significant Findings    IMPRESSION:  Left upper lung nodules has decreased in size and metabolic activity. No new nodules or adenopathy.  Resolution of FDG avid left suprahilar lymph node.  ACTIONABLE ITEMS/RECOMMENDATIONS*: None.     3/19/2024 -  Chemotherapy    Plan Name: OP DURVALUMAB 1500 MG Q4W     5/30/2024 Imaging Significant Findings    05/30/2024: CT scan: Atelectasis as well as air bronchograms seen in the left upper medial lung with a mass was previously there is infiltrative change beyond this.       Interval History:   12/06/2024   Patient present for follow up for Lung CA;   No fever  She is doing well; denies falls, s/s of infection and  abnormal bleeding.  Reports to currently taking 10 mg prednisone; O2 is good while sitting however continues to drops when she walked Discussed having to use O2 for life and holding Immunotherap      Advised to monitor BS and continue Bactrim RX.  Discuss MDA notes, labs and scans.  Encouraged hydration and only take Tylenol for any pain management.  Discussed future appointments; future pulmonary function test        Past Medical History:   Diagnosis Date    Anemia     Depression     Lung cancer       Past Surgical History:   Procedure Laterality Date    AUGMENTATION OF BREAST      COLONOSCOPY      HYSTERECTOMY      LOBECTOMY Right     R middle and R lower lobe    LUNG BIOPSY Left     PORTACATH PLACEMENT      TUBAL LIGATION       Social History     Socioeconomic History    Marital status:    Tobacco Use    Smoking status: Former     Types: Cigarettes     Start date:      Quit date:      Years since quittin.9    Smokeless tobacco: Never    Tobacco comments:     Patient quit smoking in . Patient smoked for about 20 years    Substance and Sexual Activity    Alcohol use: Yes     Comment: Social     Social Drivers of Health     Financial Resource Strain: Low Risk  (2024)    Overall Financial Resource Strain (CARDIA)     Difficulty of Paying Living Expenses: Not hard at all   Food Insecurity: No Food Insecurity (2024)    Hunger Vital Sign     Worried About Running Out of Food in the Last Year: Never true     Ran Out of Food in the Last Year: Never true   Physical Activity: Inactive (2024)    Exercise Vital Sign     Days of Exercise per Week: 0 days     Minutes of Exercise per Session: 0 min   Stress: No Stress Concern Present (2024)    [employer] - Occupational Stress Questionnaire     Feeling of Stress : Not at all   Housing Stability: Unknown (2024)    Housing Stability Vital Sign     Unable to Pay for Housing in the Last Year: No       Family History   Adopted: Yes   Problem Relation Name Age of Onset    Cancer Father          Lung      Review of patient's allergies indicates:  No Known Allergies   Review of Systems   Constitutional:  Negative for appetite change and unexpected weight change.   HENT:  Negative for mouth sores.    Eyes:  Negative for visual disturbance.   Respiratory:  Positive for shortness of breath. Negative for cough.    Cardiovascular:  Negative for chest pain.   Gastrointestinal:  Negative for abdominal pain and diarrhea.   Genitourinary:  Negative for frequency.   Musculoskeletal:  Positive for back pain.   Skin:  Negative for rash.   Neurological:  Negative for headaches.   Hematological:  Negative for adenopathy.   Psychiatric/Behavioral:  The patient is not nervous/anxious.        A complete 12 point ROS was performed in full with pertinent positives as described in interval history. Remainder of ROS done in full and are negative.        Objective:      Vitals:    12/06/24 1033   BP: 114/81   Pulse: 82   Temp: 98.4 °F (36.9 °C)           Physical Exam  Constitutional:       Appearance: She is obese.      Comments: Cushinoid on NASAL canula   HENT:      Head: Atraumatic.   Cardiovascular:      Rate and Rhythm: Normal rate.      Pulses: Normal pulses.   Pulmonary:      Effort: Tachypnea and accessory muscle usage present.      Breath sounds: Decreased air movement present.      Comments: Chronic oxygen wearing.    Able to speak full sentences, mild nasal flaring, no accessory muscle use prolonged expiratory phase,  decreased breath sounds in the bases,  Abdominal:      General: Abdomen is flat.      Palpations: Abdomen is soft.   Musculoskeletal:      Cervical back: Neck supple.      Right lower leg: No edema.      Left lower leg: No edema.   Skin:     Capillary Refill: Capillary refill takes 2 to 3 seconds.      Findings: Bruising present.   Neurological:      Mental Status: She is alert and oriented to person, place,  and time.      Cranial Nerves: No cranial nerve deficit.      Motor: No weakness.      Gait: Gait normal.   Psychiatric:         Mood and Affect: Mood normal.         Behavior: Behavior normal.         Thought Content: Thought content normal.         Judgment: Judgment normal.       LABS AND IMAGING REVIEWED IN EPIC      Assessment:   Recurrent adenocarcinoma to RM---new T1 vs M1a  lef  It appears from the reports this patient has 1 local area of recurrence of her adenocarcinoma.  However ,I explained to the patient that I need to review her definitive images.  We discussed her options if she only has 1 area of recurrent disease.  Surgery, radiation.I would be reluctant to resume systemic immunotherapy given her degree of intolerance to immunotherapy with pneumonitis diabetes, and adrenal insufficiency.     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 On sulfasalazine/plaquenil    Adrenal insufficiency-On prednisone      Uncontrolled DM, ---better with insulin pump    Hypothyroidism    Risk of pneumonitis due to prior history-prescribed QVAR but $$$$. 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 Down to 20 15 mg now  10 ( baseline 5/7 alternate   She will go to 10/5 and then will defer to Rheumatology and Pulmonary    Continue Bactrim 3 times a week with steroids and immunosuppressive risk  RTC  8  weeks  PPI prophy  Flush port today   Okay for aspirin 2 to 3 times a week  Will not resume Immuontheray at present with  toxicicty--  Explained risk of autoimmune pneumonitis, risk of ventilatory and respiratory failure.    Explained there is no evidence of active cancer on her most recent scan.    If this patient was to develop progressive cancer, I would recommend she be evaluated for another clinical trial, or alternative.  Before the use of immunotherapy.    If she has no other options discussions again would be had regarding lower dose and severe risk.        Follow up in about 8 weeks (around 1/30/2025). With labs and port flush  Orders Placed This Encounter    Respiratory Culture    Respiratory Panel        Miguel Chisholm MD

## 2024-12-06 NOTE — NURSING
Mediport flushed via sterile technique, tolerated well. Pt discharged home in stable condition, will check portal for future appts.

## 2024-12-13 DIAGNOSIS — J44.9 CHRONIC OBSTRUCTIVE PULMONARY DISEASE, UNSPECIFIED COPD TYPE: Primary | ICD-10-CM

## 2024-12-13 DIAGNOSIS — J42 CHRONIC BRONCHITIS, UNSPECIFIED CHRONIC BRONCHITIS TYPE: ICD-10-CM

## 2024-12-13 RX ORDER — BUDESONIDE 180 UG/1
2 AEROSOL, POWDER RESPIRATORY (INHALATION) EVERY 12 HOURS
Qty: 1 EACH | Refills: 1 | Status: SHIPPED | OUTPATIENT
Start: 2024-12-13

## 2025-01-14 DIAGNOSIS — R91.8 PULMONARY INFILTRATE ON CHEST X-RAY: ICD-10-CM

## 2025-01-15 RX ORDER — SULFAMETHOXAZOLE AND TRIMETHOPRIM 800; 160 MG/1; MG/1
1 TABLET ORAL
Qty: 12 TABLET | Refills: 2 | Status: SHIPPED | OUTPATIENT
Start: 2025-01-15

## 2025-01-29 NOTE — PROGRESS NOTES
Subjective:       Patient ID: Rosanna Rich is a 64 y.o. female.    Chief Complaint: 8wk f/u    Referring: Self referral  Oncology: Dr. Nguyen    Diagnosis: Recurrent adenocarcinoma of lung overlapping sites    Current Treatment: on hold                                          Clinical History:  This is a 62 y/o female with long standing history of recurrent adenocarcinoma of the lung. See above history and treatment.   She was last seen in this clinic as a second opinion in August of 2019. She continued her therapy in [place].   In August of 2023, she was diagnosed with her 4th recurrence in the RM.      Oncology History Overview Note   Treatment History:  2009-initial diagnosis. T2 N1 M0. S/P resection followed by adjuvant Cisplatin  8/11/2017: Second recurrence-T3 N0 adenocarcinoma RLL lobectomy. PDL-1 5%. Negative ALK. 4 cycles of adjuvant carboplatin and pemetrexed  8/30/2019: Recurrent T4 adenocarcinoma with pleural involvement, M1 lymph node gastrohepatic and left dome of liver.   9/19/19: Carboplatin/Taxol/Keytruda followed by Keytruda maintenance, last dose  between  4/ or 7/22/22---stopped due to pneumonitis  Pneumonitis-- was on Steroids  --SOB and oxygen  1/ 2023 new nodule  7mm/9mm---  8/2023 + PET still growing  Biopsy   9/27--- Adenocarcinoma     Malignant neoplasm of left lung   9/25/2023 Pathology Significant Finding    RM core biopsy- Focal moderately differentiated adenocarcinoma  PDL-1 58%     9/25/2023 Cancer Staged    Staging form: Lung, AJCC 8th Edition  - Clinical stage from 9/25/2023: Stage IIB (rcT1c, cN1, cM0)     10/18/2023 Imaging Significant Findings    10/18/2023: PET-CT:   Asymmetric left-sided adenoid and lingula tonsil involving central base of tongue SUV 14   Left upper lobe pulmonary mass 1.7 cm SUV of 7.9, no hilar or mediastinal uptake no pulmonary parenchymal mass .    Left port, asymmetric capsular breast calcification on implant on the right, chronic  interstitial lung disease, no lytic or blastic lesions the left upper lobe pulmonary nodules enlarged from 1.1 cm to 1.7 cm, consistent with growing neoplasm       1/3/2024 - 2/21/2024 Radiation Therapy    Start Treatment: 01-  End Treatment: 02-  Left lung/Mediast.   200 cGyRBE 35/35 7000/7000 cGyRBE=\   Proton 3-D plan   ==========================================================      1/3/2024 - 2/14/2024 Chemotherapy    Weekly carbo/taxol with xrt     1/23/2024 Pathology Significant Finding    MD JOSÉ MIGUEL OCONNELL: Mutation Analysis Precision Panel Interpretation and Report    KRAS c.35G>A p.G12D Exon 2 16% SNV - Missense   More than 5 genes. See details  in full report      2/23/2024 Research Study Participant    Research Study:    2/23/2024 -  TX-Chemotherapy/Biotherapy/Investigational/SMI (Autogenerated)      Treatment Plan     2019-1164  Autologous Lymphocyte Infusion Phase 2 (OP)      Chemotherapy summary: [No matching medication found in this treatment plan]      Treatment Dates: 2/23/2024 (Planned) to 4/5/2024 (Planned)   Line of Treatment: Solid: Metastatic, Second Line and Subsequent      Treatment Goal: Life Prolongation                  3/18/2024 Imaging Significant Findings    IMPRESSION:  Left upper lung nodules has decreased in size and metabolic activity. No new nodules or adenopathy.  Resolution of FDG avid left suprahilar lymph node.  ACTIONABLE ITEMS/RECOMMENDATIONS*: None.     3/19/2024 -  Chemotherapy    Plan Name: OP DURVALUMAB 1500 MG Q4W     5/30/2024 Imaging Significant Findings    05/30/2024: CT scan: Atelectasis as well as air bronchograms seen in the left upper medial lung with a mass was previously there is infiltrative change beyond this.       Interval History:   01/31/2025   Patient presents for 8 week follow up for lung cancer. Followed at [facility] for surveillance imaging as well.      She is doing well and continues to use her oxygen, especially at night.   Reports to  currently taking 10 mg prednisone. She has no new or worsening complaints.     Past Medical History:   Diagnosis Date    Anemia     Depression     Lung cancer       Past Surgical History:   Procedure Laterality Date    AUGMENTATION OF BREAST      COLONOSCOPY      HYSTERECTOMY      LOBECTOMY Right     R middle and R lower lobe    LUNG BIOPSY Left     PORTACATH PLACEMENT      TUBAL LIGATION       Social History     Socioeconomic History    Marital status:    Tobacco Use    Smoking status: Former     Types: Cigarettes     Start date:      Quit date: 1979     Years since quittin.1    Smokeless tobacco: Never    Tobacco comments:     Patient quit smoking in . Patient smoked for about 20 years    Substance and Sexual Activity    Alcohol use: Yes     Comment: Social     Social Drivers of Health     Financial Resource Strain: Low Risk  (2024)    Overall Financial Resource Strain (CARDIA)     Difficulty of Paying Living Expenses: Not hard at all   Food Insecurity: No Food Insecurity (2024)    Hunger Vital Sign     Worried About Running Out of Food in the Last Year: Never true     Ran Out of Food in the Last Year: Never true   Physical Activity: Inactive (2024)    Exercise Vital Sign     Days of Exercise per Week: 0 days     Minutes of Exercise per Session: 0 min   Stress: No Stress Concern Present (2024)    [employer] - Occupational Stress Questionnaire     Feeling of Stress : Not at all   Housing Stability: Unknown (2024)    Housing Stability Vital Sign     Unable to Pay for Housing in the Last Year: No      Family History   Adopted: Yes   Problem Relation Name Age of Onset    Cancer Father          Lung      Review of patient's allergies indicates:  No Known Allergies   Review of Systems   Constitutional:  Negative for appetite change and unexpected weight change.   HENT:  Negative for mouth sores.    Eyes:  Negative for visual disturbance.    Respiratory:  Positive for shortness of breath. Negative for cough.    Cardiovascular:  Negative for chest pain.   Gastrointestinal:  Negative for abdominal pain and diarrhea.   Genitourinary:  Negative for frequency.   Musculoskeletal:  Positive for back pain.   Skin:  Negative for rash.   Neurological:  Negative for headaches.   Hematological:  Negative for adenopathy.   Psychiatric/Behavioral:  The patient is not nervous/anxious.        A complete 12 point ROS was performed in full with pertinent positives as described in interval history. Remainder of ROS done in full and are negative.        Objective:      Vitals:    01/31/25 1047   BP: 118/77   Pulse: 78   Resp: 18   Temp: 97.9 °F (36.6 °C)         Physical Exam  Constitutional:       Appearance: She is obese.      Comments: Cushinoid on NASAL canula   HENT:      Head: Atraumatic.   Cardiovascular:      Rate and Rhythm: Normal rate.      Pulses: Normal pulses.   Pulmonary:      Breath sounds: Decreased air movement present.      Comments: Chronic oxygen wearing.    Able to speak full sentences, mild nasal flaring, no accessory muscle use prolonged expiratory phase,  decreased breath sounds in the bases,  Abdominal:      General: Abdomen is flat.      Palpations: Abdomen is soft.   Musculoskeletal:      Cervical back: Neck supple.      Right lower leg: No edema.      Left lower leg: No edema.   Skin:     Capillary Refill: Capillary refill takes 2 to 3 seconds.   Neurological:      Mental Status: She is alert and oriented to person, place, and time.      Cranial Nerves: No cranial nerve deficit.      Motor: No weakness.      Gait: Gait normal.   Psychiatric:         Mood and Affect: Mood normal.         Behavior: Behavior normal.         Thought Content: Thought content normal.         Judgment: Judgment normal.       LABS AND IMAGING REVIEWED IN EPIC      Assessment:   Recurrent adenocarcinoma to RM---new T1 vs M1a  lef  It appears from the reports this  patient has 1 local area of recurrence of her adenocarcinoma.  However ,I explained to the patient that I need to review her definitive images.  We discussed her options if she only has 1 area of recurrent disease.  Surgery, radiation.I would be reluctant to resume systemic immunotherapy given her degree of intolerance to immunotherapy with pneumonitis, diabetes, and adrenal insufficiency.     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 On sulfasalazine/plaquenil    Adrenal insufficiency-On prednisone      Risk of pneumonitis due to prior history-prescribed QVAR but $$$$. Changed to pulmicort    History of immune therapy related lung toxicity-O2 dependent      Plan:   PET CT 11/25/24 showed stable RM radiation fibrosis and post surgical changes. B/l parenchymal findings likely ongoing pneumonitis although cannot rule out multifocal recurrence.   Continue Bactrim 3 times a week with steroids and immunosuppressive risk  PPI prophy  Flush port today   Okay for aspirin 2 to 3 times a week  Will not resume Immuontheray at present with toxicicty  Keep follow up with [facility]          Follow up in about 8 weeks (around 3/28/2025) for OV with Dr. Chisholm.

## 2025-01-31 ENCOUNTER — INFUSION (OUTPATIENT)
Dept: INFUSION THERAPY | Facility: HOSPITAL | Age: 65
End: 2025-01-31
Attending: INTERNAL MEDICINE
Payer: MEDICARE

## 2025-01-31 ENCOUNTER — OFFICE VISIT (OUTPATIENT)
Dept: HEMATOLOGY/ONCOLOGY | Facility: CLINIC | Age: 65
End: 2025-01-31
Payer: MEDICARE

## 2025-01-31 VITALS
OXYGEN SATURATION: 97 % | HEART RATE: 78 BPM | BODY MASS INDEX: 40.9 KG/M2 | SYSTOLIC BLOOD PRESSURE: 118 MMHG | TEMPERATURE: 98 F | HEIGHT: 65 IN | RESPIRATION RATE: 18 BRPM | DIASTOLIC BLOOD PRESSURE: 77 MMHG | WEIGHT: 245.5 LBS

## 2025-01-31 DIAGNOSIS — E03.2 HYPOTHYROIDISM DUE TO MEDICATION: ICD-10-CM

## 2025-01-31 DIAGNOSIS — D84.821 IMMUNODEFICIENCY DUE TO DRUGS: ICD-10-CM

## 2025-01-31 DIAGNOSIS — Z79.4 DIABETES MELLITUS TREATED WITH INSULIN: ICD-10-CM

## 2025-01-31 DIAGNOSIS — E11.9 DIABETES MELLITUS TREATED WITH INSULIN: ICD-10-CM

## 2025-01-31 DIAGNOSIS — C34.92 MALIGNANT NEOPLASM OF LEFT LUNG, UNSPECIFIED PART OF LUNG: Primary | ICD-10-CM

## 2025-01-31 DIAGNOSIS — Z95.828 PORT-A-CATH IN PLACE: ICD-10-CM

## 2025-01-31 DIAGNOSIS — Z95.828 PORT-A-CATH IN PLACE: Primary | ICD-10-CM

## 2025-01-31 DIAGNOSIS — Z79.899 IMMUNODEFICIENCY DUE TO DRUGS: ICD-10-CM

## 2025-01-31 PROCEDURE — 99214 OFFICE O/P EST MOD 30 MIN: CPT | Mod: S$PBB,,, | Performed by: NURSE PRACTITIONER

## 2025-01-31 PROCEDURE — 96523 IRRIG DRUG DELIVERY DEVICE: CPT

## 2025-01-31 PROCEDURE — 63600175 PHARM REV CODE 636 W HCPCS: Performed by: INTERNAL MEDICINE

## 2025-01-31 PROCEDURE — 99213 OFFICE O/P EST LOW 20 MIN: CPT | Mod: PBBFAC | Performed by: NURSE PRACTITIONER

## 2025-01-31 PROCEDURE — 99999 PR PBB SHADOW E&M-EST. PATIENT-LVL III: CPT | Mod: PBBFAC,,, | Performed by: NURSE PRACTITIONER

## 2025-01-31 RX ORDER — HEPARIN 100 UNIT/ML
500 SYRINGE INTRAVENOUS
Status: DISCONTINUED | OUTPATIENT
Start: 2025-01-31 | End: 2025-01-31 | Stop reason: HOSPADM

## 2025-01-31 RX ORDER — HEPARIN 100 UNIT/ML
500 SYRINGE INTRAVENOUS
OUTPATIENT
Start: 2025-03-28

## 2025-01-31 RX ORDER — SODIUM CHLORIDE 0.9 % (FLUSH) 0.9 %
10 SYRINGE (ML) INJECTION
OUTPATIENT
Start: 2025-03-28

## 2025-01-31 RX ORDER — SODIUM CHLORIDE 0.9 % (FLUSH) 0.9 %
10 SYRINGE (ML) INJECTION
Status: DISCONTINUED | OUTPATIENT
Start: 2025-01-31 | End: 2025-01-31 | Stop reason: HOSPADM

## 2025-01-31 RX ADMIN — HEPARIN 500 UNITS: 100 SYRINGE at 11:01

## 2025-02-11 ENCOUNTER — PATIENT MESSAGE (OUTPATIENT)
Dept: HEMATOLOGY/ONCOLOGY | Facility: CLINIC | Age: 65
End: 2025-02-11
Payer: MEDICARE

## 2025-04-14 NOTE — PROGRESS NOTES
Subjective:       Patient ID: Rosanna Rich is a 64 y.o. female.    Chief Complaint: 8wk f/u    Referring: Self referral  Oncology: Dr. Nguyen    Diagnosis: Recurrent adenocarcinoma of lung overlapping sites    Current Treatment: on hold                                          Clinical History:  This is a 64 y/o female with long standing history of recurrent adenocarcinoma of the lung. See above history and treatment.   She was last seen in this clinic as a second opinion in August of 2019. She continued her therapy in [place].   In August of 2023, she was diagnosed with her 4th recurrence in the RM.      Oncology History Overview Note   Treatment History:  2009-initial diagnosis. T2 N1 M0. S/P resection followed by adjuvant Cisplatin  8/11/2017: Second recurrence-T3 N0 adenocarcinoma RLL lobectomy. PDL-1 5%. Negative ALK. 4 cycles of adjuvant carboplatin and pemetrexed  8/30/2019: Recurrent T4 adenocarcinoma with pleural involvement, M1 lymph node gastrohepatic and left dome of liver.   9/19/19: Carboplatin/Taxol/Keytruda followed by Keytruda maintenance, last dose  between  4/ or 7/22/22---stopped due to pneumonitis  Pneumonitis-- was on Steroids  --SOB and oxygen  1/ 2023 new nodule  7mm/9mm---  8/2023 + PET still growing  Biopsy   9/27--- Adenocarcinoma     Malignant neoplasm of left lung   9/25/2023 Pathology Significant Finding    RM core biopsy- Focal moderately differentiated adenocarcinoma  PDL-1 58%     9/25/2023 Cancer Staged    Staging form: Lung, AJCC 8th Edition  - Clinical stage from 9/25/2023: Stage IIB (rcT1c, cN1, cM0)     10/18/2023 Imaging Significant Findings    10/18/2023: PET-CT:   Asymmetric left-sided adenoid and lingula tonsil involving central base of tongue SUV 14   Left upper lobe pulmonary mass 1.7 cm SUV of 7.9, no hilar or mediastinal uptake no pulmonary parenchymal mass .    Left port, asymmetric capsular breast calcification on implant on the right, chronic  interstitial lung disease, no lytic or blastic lesions the left upper lobe pulmonary nodules enlarged from 1.1 cm to 1.7 cm, consistent with growing neoplasm       1/3/2024 - 2/21/2024 Radiation Therapy    Start Treatment: 01-  End Treatment: 02-  Left lung/Mediast.   200 cGyRBE 35/35 7000/7000 cGyRBE=\   Proton 3-D plan   ==========================================================      1/3/2024 - 2/14/2024 Chemotherapy    Weekly carbo/taxol with xrt     1/23/2024 Pathology Significant Finding    MD JOSÉ MIGUEL OCONNELL: Mutation Analysis Precision Panel Interpretation and Report    KRAS c.35G>A p.G12D Exon 2 16% SNV - Missense   More than 5 genes. See details  in full report      2/23/2024 Research Study Participant    Research Study:    2/23/2024 -  [place]-Chemotherapy/Biotherapy/Investigational/SMI (Autogenerated)      Treatment Plan     2019-1164  Autologous Lymphocyte Infusion Phase 2 (OP)      Chemotherapy summary: [No matching medication found in this treatment plan]      Treatment Dates: 2/23/2024 (Planned) to 4/5/2024 (Planned)   Line of Treatment: Solid: Metastatic, Second Line and Subsequent      Treatment Goal: Life Prolongation                  3/18/2024 Imaging Significant Findings    IMPRESSION:  Left upper lung nodules has decreased in size and metabolic activity. No new nodules or adenopathy.  Resolution of FDG avid left suprahilar lymph node.  ACTIONABLE ITEMS/RECOMMENDATIONS*: None.     3/19/2024 -  Chemotherapy    Plan Name: OP DURVALUMAB 1500 MG Q4W     5/30/2024 Imaging Significant Findings    05/30/2024: CT scan: Atelectasis as well as air bronchograms seen in the left upper medial lung with a mass was previously there is infiltrative change beyond this.       Interval History:   04/15/2025   Patient presents for follow up for lung cancer; she is scheduled for MPF on this day.  Her BS is elevate however she monitors.  She has no concerns; breathing well.  Does not use O2 at home when resting  or sitting.  Reviewed recent labs; encouraged hydration.    Her next appointment with [facility] is May 30,2025 - repeat scan and sees thoracic provider.  Rotates steroids 10 mg and 15 mg every other day.  Encouraged to discussed steroid dosage with Pulmonologist - appointment in May 2025.  Advised to continue taking Bactrim and Mucinex.  Discussed DX hx and keeping MP in place.  Cataract surgery Monday.    Past Medical History:   Diagnosis Date    Anemia     Depression     Lung cancer       Past Surgical History:   Procedure Laterality Date    AUGMENTATION OF BREAST      BREAST SURGERY      COLONOSCOPY      HYSTERECTOMY      LOBECTOMY Right     R middle and R lower lobe    LUNG BIOPSY Left     PORTACATH PLACEMENT      TUBAL LIGATION       Social History     Socioeconomic History    Marital status:    Tobacco Use    Smoking status: Former     Average packs/day: 0.5 packs/day for 15.0 years (7.5 ttl pk-yrs)     Types: Cigarettes     Start date:      Quit date:      Years since quittin.3    Smokeless tobacco: Never    Tobacco comments:     Quit in    Substance and Sexual Activity    Alcohol use: Yes     Alcohol/week: 4.0 standard drinks of alcohol     Types: 4 Cans of beer per week     Comment: Social    Drug use: Never     Social Drivers of Health     Financial Resource Strain: Low Risk  (2024)    Overall Financial Resource Strain (CARDIA)     Difficulty of Paying Living Expenses: Not hard at all   Food Insecurity: No Food Insecurity (2024)    Hunger Vital Sign     Worried About Running Out of Food in the Last Year: Never true     Ran Out of Food in the Last Year: Never true   Physical Activity: Inactive (2024)    Exercise Vital Sign     Days of Exercise per Week: 0 days     Minutes of Exercise per Session: 0 min   Stress: No Stress Concern Present (2024)    [employer] - Occupational Stress Questionnaire     Feeling of Stress : Not at all   Housing  Stability: Unknown (7/12/2024)    Housing Stability Vital Sign     Unable to Pay for Housing in the Last Year: No      Family History   Adopted: Yes   Problem Relation Name Age of Onset    Cancer Father -         Lung      Review of patient's allergies indicates:  No Known Allergies   Review of Systems   Constitutional:  Negative for appetite change and unexpected weight change.   HENT:  Negative for mouth sores.    Eyes:  Negative for visual disturbance.   Respiratory:  Positive for shortness of breath. Negative for cough.    Cardiovascular:  Negative for chest pain.   Gastrointestinal:  Negative for abdominal pain and diarrhea.   Genitourinary:  Negative for frequency.   Musculoskeletal:  Positive for back pain.   Skin:  Negative for rash.   Neurological:  Negative for headaches.   Hematological:  Negative for adenopathy.   Psychiatric/Behavioral:  The patient is not nervous/anxious.        A complete 12 point ROS was performed in full with pertinent positives as described in interval history. Remainder of ROS done in full and are negative.        Objective:      Vitals:    04/15/25 1041   BP: 115/80   Pulse: 80   Resp: 20   Temp: 98.2 °F (36.8 °C)     Physical Exam  Constitutional:       Appearance: She is obese.      Comments: Cushinoid on NASAL canula   HENT:      Head: Atraumatic.   Cardiovascular:      Rate and Rhythm: Normal rate.      Pulses: Normal pulses.   Pulmonary:      Breath sounds: Decreased air movement present.      Comments: Chronic oxygen wearing.    Able to speak full sentences, mild nasal flaring, no accessory muscle use prolonged expiratory phase,  decreased breath sounds in the bases,  Abdominal:      General: Abdomen is flat.      Palpations: Abdomen is soft.   Musculoskeletal:      Cervical back: Neck supple.      Right lower leg: No edema.      Left lower leg: No edema.   Skin:     Capillary Refill: Capillary refill takes 2 to 3 seconds.   Neurological:      Mental Status: She is alert  and oriented to person, place, and time.      Cranial Nerves: No cranial nerve deficit.      Motor: No weakness.      Gait: Gait normal.   Psychiatric:         Mood and Affect: Mood normal.         Behavior: Behavior normal.         Thought Content: Thought content normal.         Judgment: Judgment normal.       LABS AND IMAGING REVIEWED IN EPIC      Assessment:   Recurrent adenocarcinoma to RM---new T1 vs M1a  lef  It appears from the reports this patient has 1 local area of recurrence of her adenocarcinoma.  However ,I explained to the patient that I need to review her definitive images.  We discussed her options if she only has 1 area of recurrent disease.  Surgery, radiation.I would be reluctant to resume systemic immunotherapy given her degree of intolerance to immunotherapy with pneumonitis, diabetes, and adrenal insufficiency.     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 On sulfasalazine/plaquenil    Adrenal insufficiency-On prednisone          Wean with rheumatology to lowest dose possible    Risk of pneumonitis due to prior history-prescribed QVAR but $$$$. Changed to pulmicort    History of immune therapy related lung toxicity-O2 dependent      Plan:   PET CT 11/25/24 showed stable RM radiation fibrosis and post surgical changes. B/l parenchymal findings likely ongoing pneumonitis although cannot rule out multifocal recurrence.   Continue Bactrim 3 times a week with steroids and immunosuppressive risk  PPI prophy  Flush port today   Okay for aspirin 2 to 3 times a week if needed   No other nsaid  Will not resume Immuontheray at present with toxicicty  Keep follow up with [facility] next month  Keep port --for now discuss in 2026      I, Latisha Hernández LPN, acted solely as a scribe for and in the presence of, Dr. Miguel Chisholm who performed the service.    Follow up in about 2 months (around 6/15/2025) for OV and mpf.              Miguel Chisholm MD

## 2025-04-15 ENCOUNTER — OFFICE VISIT (OUTPATIENT)
Dept: HEMATOLOGY/ONCOLOGY | Facility: CLINIC | Age: 65
End: 2025-04-15
Payer: MEDICARE

## 2025-04-15 ENCOUNTER — INFUSION (OUTPATIENT)
Dept: INFUSION THERAPY | Facility: HOSPITAL | Age: 65
End: 2025-04-15
Attending: INTERNAL MEDICINE
Payer: MEDICARE

## 2025-04-15 VITALS
OXYGEN SATURATION: 96 % | HEIGHT: 65 IN | SYSTOLIC BLOOD PRESSURE: 115 MMHG | TEMPERATURE: 98 F | WEIGHT: 245.19 LBS | BODY MASS INDEX: 40.85 KG/M2 | HEART RATE: 80 BPM | DIASTOLIC BLOOD PRESSURE: 80 MMHG | RESPIRATION RATE: 20 BRPM

## 2025-04-15 VITALS — RESPIRATION RATE: 20 BRPM

## 2025-04-15 DIAGNOSIS — E03.2 HYPOTHYROIDISM DUE TO MEDICATION: ICD-10-CM

## 2025-04-15 DIAGNOSIS — Z95.828 PORT-A-CATH IN PLACE: ICD-10-CM

## 2025-04-15 DIAGNOSIS — C34.92 MALIGNANT NEOPLASM OF LEFT LUNG, UNSPECIFIED PART OF LUNG: Primary | ICD-10-CM

## 2025-04-15 DIAGNOSIS — R91.8 PULMONARY INFILTRATE ON CHEST X-RAY: ICD-10-CM

## 2025-04-15 DIAGNOSIS — Z95.828 PORT-A-CATH IN PLACE: Primary | ICD-10-CM

## 2025-04-15 PROCEDURE — 63600175 PHARM REV CODE 636 W HCPCS: Performed by: INTERNAL MEDICINE

## 2025-04-15 PROCEDURE — 96523 IRRIG DRUG DELIVERY DEVICE: CPT

## 2025-04-15 PROCEDURE — 99999 PR PBB SHADOW E&M-EST. PATIENT-LVL V: CPT | Mod: PBBFAC,,, | Performed by: INTERNAL MEDICINE

## 2025-04-15 PROCEDURE — 99213 OFFICE O/P EST LOW 20 MIN: CPT | Mod: S$PBB,,, | Performed by: INTERNAL MEDICINE

## 2025-04-15 PROCEDURE — 99215 OFFICE O/P EST HI 40 MIN: CPT | Mod: PBBFAC | Performed by: INTERNAL MEDICINE

## 2025-04-15 RX ORDER — HEPARIN 100 UNIT/ML
500 SYRINGE INTRAVENOUS
OUTPATIENT
Start: 2025-05-20

## 2025-04-15 RX ORDER — SULFAMETHOXAZOLE AND TRIMETHOPRIM 800; 160 MG/1; MG/1
1 TABLET ORAL
Qty: 12 TABLET | Refills: 2 | Status: SHIPPED | OUTPATIENT
Start: 2025-04-16

## 2025-04-15 RX ORDER — SODIUM CHLORIDE 0.9 % (FLUSH) 0.9 %
10 SYRINGE (ML) INJECTION
OUTPATIENT
Start: 2025-05-20

## 2025-04-15 RX ORDER — HEPARIN 100 UNIT/ML
500 SYRINGE INTRAVENOUS
Status: DISCONTINUED | OUTPATIENT
Start: 2025-04-15 | End: 2025-04-15 | Stop reason: HOSPADM

## 2025-04-15 RX ORDER — SODIUM CHLORIDE 0.9 % (FLUSH) 0.9 %
10 SYRINGE (ML) INJECTION
Status: DISCONTINUED | OUTPATIENT
Start: 2025-04-15 | End: 2025-04-15 | Stop reason: HOSPADM

## 2025-04-15 RX ADMIN — HEPARIN 500 UNITS: 100 SYRINGE at 11:04

## 2025-06-16 NOTE — PROGRESS NOTES
Subjective:       Patient ID: Rosanna Rich is a 65 y.o. female.    Chief Complaint: 2mo f/u    Referring: Self referral  Oncology: Dr. Nguyen    Diagnosis: Recurrent adenocarcinoma of lung overlapping sites    Current Treatment: Observation                                          Clinical History:  This is a 64 y/o female with long standing history of recurrent adenocarcinoma of the lung. See above history and treatment.   She was last seen in this clinic as a second opinion in August of 2019. She continued her therapy in [place].   In August of 2023, she was diagnosed with her 4th recurrence in the RM.      Oncology History Overview Note   Treatment History:  2009-initial diagnosis. T2 N1 M0. S/P resection followed by adjuvant Cisplatin  8/11/2017: Second recurrence-T3 N0 adenocarcinoma RLL lobectomy. PDL-1 5%. Negative ALK. 4 cycles of adjuvant carboplatin and pemetrexed  8/30/2019: Recurrent T4 adenocarcinoma with pleural involvement, M1 lymph node gastrohepatic and left dome of liver.   9/19/19: Carboplatin/Taxol/Keytruda followed by Keytruda maintenance, last dose  between  4/ or 7/22/22---stopped due to pneumonitis  Pneumonitis-- was on Steroids  --SOB and oxygen  1/ 2023 new nodule  7mm/9mm---  8/2023 + PET still growing  Biopsy   9/27--- Adenocarcinoma     Malignant neoplasm of left lung   9/25/2023 Cancer Staged    Staging form: Lung, AJCC 8th Edition  - Clinical stage from 9/25/2023: Stage IIB (rcT1c, cN1, cM0)     1/3/2024 - 2/21/2024 Radiation Therapy    Start Treatment: 01-  End Treatment: 02-  Left lung/Mediast.   200 cGyRBE 35/35 7000/7000 cGyRBE=\   Proton 3-D plan   ==========================================================      1/3/2024 - 2/14/2024 Chemotherapy    Weekly carbo/taxol with xrt     2/23/2024 Research Study Participant    X-Chemotherapy/Biotherapy/Investigational/SMI (Autogenerated)       3/19/2024 - 5/2024 Chemotherapy    Plan Name: OP DURVALUMAB  1500 MG Q4W       Interval History:   2025   Patient presents for follow up for lung cancer  and MPF. Reviewed correspondence and scans from [facility] in May. Restaging CT CAP shows stable disease without evidence of recurrence or new lesions.     Past Medical History:   Diagnosis Date    Anemia     Depression     Lung cancer       Past Surgical History:   Procedure Laterality Date    AUGMENTATION OF BREAST      BREAST SURGERY      COLONOSCOPY      HYSTERECTOMY      LOBECTOMY Right     R middle and R lower lobe    LUNG BIOPSY Left     PORTACATH PLACEMENT      TUBAL LIGATION       Social History     Socioeconomic History    Marital status:    Tobacco Use    Smoking status: Former     Average packs/day: 0.5 packs/day for 15.0 years (7.5 ttl pk-yrs)     Types: Cigarettes     Start date:      Quit date: 1979     Years since quittin.5    Smokeless tobacco: Never    Tobacco comments:     Quit in    Substance and Sexual Activity    Alcohol use: Yes     Alcohol/week: 4.0 standard drinks of alcohol     Types: 4 Cans of beer per week     Comment: Social    Drug use: Never     Social Drivers of Health     Financial Resource Strain: Low Risk  (2024)    Overall Financial Resource Strain (CARDIA)     Difficulty of Paying Living Expenses: Not hard at all   Food Insecurity: No Food Insecurity (2024)    Hunger Vital Sign     Worried About Running Out of Food in the Last Year: Never true     Ran Out of Food in the Last Year: Never true   Physical Activity: Inactive (2024)    Exercise Vital Sign     Days of Exercise per Week: 0 days     Minutes of Exercise per Session: 0 min   Stress: No Stress Concern Present (2024)    [employer] - Occupational Stress Questionnaire     Feeling of Stress : Not at all   Housing Stability: Unknown (2024)    Housing Stability Vital Sign     Unable to Pay for Housing in the Last Year: No      Family History   Adopted: Yes    Problem Relation Name Age of Onset    Cancer Father -         Lung      Review of patient's allergies indicates:  No Known Allergies   Review of Systems   Constitutional:  Negative for appetite change and unexpected weight change.   HENT:  Negative for mouth sores.    Eyes:  Negative for visual disturbance.   Respiratory:  Positive for shortness of breath. Negative for cough.    Cardiovascular:  Negative for chest pain.   Gastrointestinal:  Negative for abdominal pain and diarrhea.   Genitourinary:  Negative for frequency.   Musculoskeletal:  Positive for back pain.   Skin:  Negative for rash.   Neurological:  Negative for headaches.   Hematological:  Negative for adenopathy.   Psychiatric/Behavioral:  The patient is not nervous/anxious.        A complete 12 point ROS was performed in full with pertinent positives as described in interval history. Remainder of ROS done in full and are negative.        Objective:      Vitals:    06/30/25 1052   BP: 115/81   Pulse: 71   Resp: 18   Temp: 98.1 °F (36.7 °C)       Physical Exam  Constitutional:       Appearance: She is obese.      Comments: Cushinoid on NASAL canula   HENT:      Head: Atraumatic.   Cardiovascular:      Rate and Rhythm: Normal rate.      Pulses: Normal pulses.   Pulmonary:      Breath sounds: Decreased air movement present.      Comments: Chronic oxygen wearing.    Able to speak full sentences, mild nasal flaring, no accessory muscle use prolonged expiratory phase,  decreased breath sounds in the bases,  Abdominal:      General: Abdomen is flat.      Palpations: Abdomen is soft.   Musculoskeletal:      Cervical back: Neck supple.      Right lower leg: No edema.      Left lower leg: No edema.   Skin:     Capillary Refill: Capillary refill takes 2 to 3 seconds.   Neurological:      Mental Status: She is alert and oriented to person, place, and time.      Cranial Nerves: No cranial nerve deficit.      Motor: No weakness.      Gait: Gait normal.    Psychiatric:         Mood and Affect: Mood normal.         Behavior: Behavior normal.         Thought Content: Thought content normal.         Judgment: Judgment normal.       LABS AND IMAGING REVIEWED IN EPIC      Assessment:   Primary Oncologic Diagnosis: Recurrent adenocarcinoma of lung  Diagnosed:  2009:  KRAS G13C RML S/P lobectomy   2017 Recurrence RLL T3N0 S/P RLL lobectomy. Adjuvant Carboplatin/Pemetrexed X 4  2019 Metastatic recurrence with pleural and abdominal LN involvement. S/P Carboplatin/Taxol/Pembrolizumab complicated by pneumonitis and required high dose steroids.   9/2023: Bx proven recurrence RM. PDL1 58%. Liquid bx notable for FGFR2 p.S533P and TP53 p.C277F.  12L LN positive. KRAS G12D  1/3/24-2/21/24: 2. S/P ChemoXRT to RM  Durvalumab Maintenance X3 complicated by pneumonitis  Status: Restaging CT CAP shows stable disease without evidence of recurrence or new lesions.  Sites of disease: RM, RLL, pleural and abdominal LNs       Dx: RA/Discoid lupus  Status: ongoing  Followed by rheumatology. 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

## 2025-06-30 ENCOUNTER — OFFICE VISIT (OUTPATIENT)
Dept: HEMATOLOGY/ONCOLOGY | Facility: CLINIC | Age: 65
End: 2025-06-30
Payer: MEDICARE

## 2025-06-30 ENCOUNTER — INFUSION (OUTPATIENT)
Dept: INFUSION THERAPY | Facility: HOSPITAL | Age: 65
End: 2025-06-30
Attending: INTERNAL MEDICINE
Payer: MEDICARE

## 2025-06-30 VITALS
RESPIRATION RATE: 18 BRPM | TEMPERATURE: 98 F | HEIGHT: 65 IN | WEIGHT: 245 LBS | OXYGEN SATURATION: 97 % | SYSTOLIC BLOOD PRESSURE: 115 MMHG | DIASTOLIC BLOOD PRESSURE: 81 MMHG | HEART RATE: 71 BPM | BODY MASS INDEX: 40.82 KG/M2

## 2025-06-30 DIAGNOSIS — E27.49 ADRENAL SUPPRESSION: ICD-10-CM

## 2025-06-30 DIAGNOSIS — E11.9 DIABETES MELLITUS TREATED WITH INSULIN: ICD-10-CM

## 2025-06-30 DIAGNOSIS — C34.91 BILATERAL LUNG CANCER: Primary | ICD-10-CM

## 2025-06-30 DIAGNOSIS — C34.92 BILATERAL LUNG CANCER: Primary | ICD-10-CM

## 2025-06-30 DIAGNOSIS — J42 CHRONIC BRONCHITIS, UNSPECIFIED CHRONIC BRONCHITIS TYPE: ICD-10-CM

## 2025-06-30 DIAGNOSIS — J91.0 MALIGNANT PLEURAL EFFUSION: ICD-10-CM

## 2025-06-30 DIAGNOSIS — Z79.4 DIABETES MELLITUS TREATED WITH INSULIN: ICD-10-CM

## 2025-06-30 DIAGNOSIS — Z95.828 PORT-A-CATH IN PLACE: Primary | ICD-10-CM

## 2025-06-30 DIAGNOSIS — C34.90 RECURRENT ADENOCARCINOMA OF LUNG, UNSPECIFIED LATERALITY: ICD-10-CM

## 2025-06-30 PROCEDURE — 99999 PR PBB SHADOW E&M-EST. PATIENT-LVL V: CPT | Mod: PBBFAC,,, | Performed by: NURSE PRACTITIONER

## 2025-06-30 PROCEDURE — 63600175 PHARM REV CODE 636 W HCPCS: Performed by: INTERNAL MEDICINE

## 2025-06-30 PROCEDURE — 96523 IRRIG DRUG DELIVERY DEVICE: CPT

## 2025-06-30 PROCEDURE — 99215 OFFICE O/P EST HI 40 MIN: CPT | Mod: PBBFAC,25 | Performed by: NURSE PRACTITIONER

## 2025-06-30 PROCEDURE — 99214 OFFICE O/P EST MOD 30 MIN: CPT | Mod: S$PBB,,, | Performed by: NURSE PRACTITIONER

## 2025-06-30 RX ORDER — HEPARIN 100 UNIT/ML
500 SYRINGE INTRAVENOUS
OUTPATIENT
Start: 2025-08-04

## 2025-06-30 RX ORDER — SODIUM CHLORIDE 0.9 % (FLUSH) 0.9 %
10 SYRINGE (ML) INJECTION
Status: DISCONTINUED | OUTPATIENT
Start: 2025-06-30 | End: 2025-06-30 | Stop reason: HOSPADM

## 2025-06-30 RX ORDER — HEPARIN 100 UNIT/ML
500 SYRINGE INTRAVENOUS
Status: DISCONTINUED | OUTPATIENT
Start: 2025-06-30 | End: 2025-06-30 | Stop reason: HOSPADM

## 2025-06-30 RX ORDER — SODIUM CHLORIDE 0.9 % (FLUSH) 0.9 %
10 SYRINGE (ML) INJECTION
OUTPATIENT
Start: 2025-08-04

## 2025-06-30 RX ADMIN — HEPARIN 500 UNITS: 100 SYRINGE at 11:06

## 2025-06-30 NOTE — PLAN OF CARE
MPF performed after OV. No appts made at time of discharge. Will await phone call from schedulers.

## 2025-07-11 DIAGNOSIS — R91.8 PULMONARY INFILTRATE ON CHEST X-RAY: ICD-10-CM

## 2025-07-11 RX ORDER — SULFAMETHOXAZOLE AND TRIMETHOPRIM 800; 160 MG/1; MG/1
1 TABLET ORAL
Qty: 12 TABLET | Refills: 2 | Status: SHIPPED | OUTPATIENT
Start: 2025-07-11

## 2025-09-02 ENCOUNTER — OFFICE VISIT (OUTPATIENT)
Dept: HEMATOLOGY/ONCOLOGY | Facility: CLINIC | Age: 65
End: 2025-09-02
Payer: MEDICARE

## 2025-09-02 DIAGNOSIS — E66.813 CLASS 3 OBESITY: ICD-10-CM

## 2025-09-02 DIAGNOSIS — C34.90 RECURRENT ADENOCARCINOMA OF LUNG, UNSPECIFIED LATERALITY: ICD-10-CM

## 2025-09-02 DIAGNOSIS — C34.92 BILATERAL LUNG CANCER: Primary | ICD-10-CM

## 2025-09-02 DIAGNOSIS — J91.0 MALIGNANT PLEURAL EFFUSION: ICD-10-CM

## 2025-09-02 DIAGNOSIS — C34.91 BILATERAL LUNG CANCER: Primary | ICD-10-CM

## 2025-09-02 DIAGNOSIS — J42 CHRONIC BRONCHITIS, UNSPECIFIED CHRONIC BRONCHITIS TYPE: ICD-10-CM

## 2025-09-02 PROCEDURE — 98005 SYNCH AUDIO-VIDEO EST LOW 20: CPT | Mod: 95,,, | Performed by: NURSE PRACTITIONER

## 2025-09-02 RX ORDER — HEPARIN 100 UNIT/ML
500 SYRINGE INTRAVENOUS
OUTPATIENT
Start: 2025-09-02

## 2025-09-02 RX ORDER — SODIUM CHLORIDE 0.9 % (FLUSH) 0.9 %
10 SYRINGE (ML) INJECTION
OUTPATIENT
Start: 2025-09-02